# Patient Record
Sex: MALE | Race: WHITE | NOT HISPANIC OR LATINO | Employment: OTHER | URBAN - METROPOLITAN AREA
[De-identification: names, ages, dates, MRNs, and addresses within clinical notes are randomized per-mention and may not be internally consistent; named-entity substitution may affect disease eponyms.]

---

## 2023-07-05 LAB
ALANINE AMINOTRANSFERASE (SGPT) (U/L) IN SER/PLAS: 23 U/L (ref 10–52)
ALBUMIN (G/DL) IN SER/PLAS: 4.1 G/DL (ref 3.4–5)
ALBUMIN (MG/L) IN URINE: 28.9 MG/L
ALBUMIN/CREATININE (UG/MG) IN URINE: 43.9 UG/MG CRT (ref 0–30)
ALKALINE PHOSPHATASE (U/L) IN SER/PLAS: 70 U/L (ref 33–136)
ANION GAP IN SER/PLAS: 13 MMOL/L (ref 10–20)
ASPARTATE AMINOTRANSFERASE (SGOT) (U/L) IN SER/PLAS: 28 U/L (ref 9–39)
BASOPHILS (10*3/UL) IN BLOOD BY AUTOMATED COUNT: 0.07 X10E9/L (ref 0–0.1)
BASOPHILS/100 LEUKOCYTES IN BLOOD BY AUTOMATED COUNT: 1 % (ref 0–2)
BILIRUBIN TOTAL (MG/DL) IN SER/PLAS: 0.8 MG/DL (ref 0–1.2)
CALCIDIOL (25 OH VITAMIN D3) (NG/ML) IN SER/PLAS: 60 NG/ML
CALCIUM (MG/DL) IN SER/PLAS: 10 MG/DL (ref 8.6–10.3)
CARBON DIOXIDE, TOTAL (MMOL/L) IN SER/PLAS: 24 MMOL/L (ref 21–32)
CHLORIDE (MMOL/L) IN SER/PLAS: 105 MMOL/L (ref 98–107)
CHOLESTEROL (MG/DL) IN SER/PLAS: 121 MG/DL (ref 0–199)
CHOLESTEROL IN HDL (MG/DL) IN SER/PLAS: 61.1 MG/DL
CHOLESTEROL/HDL RATIO: 2
CREATININE (MG/DL) IN SER/PLAS: 0.93 MG/DL (ref 0.5–1.3)
CREATININE (MG/DL) IN URINE: 65.9 MG/DL (ref 20–370)
EOSINOPHILS (10*3/UL) IN BLOOD BY AUTOMATED COUNT: 0.11 X10E9/L (ref 0–0.4)
EOSINOPHILS/100 LEUKOCYTES IN BLOOD BY AUTOMATED COUNT: 1.6 % (ref 0–6)
ERYTHROCYTE DISTRIBUTION WIDTH (RATIO) BY AUTOMATED COUNT: 13.6 % (ref 11.5–14.5)
ERYTHROCYTE MEAN CORPUSCULAR HEMOGLOBIN CONCENTRATION (G/DL) BY AUTOMATED: 34.1 G/DL (ref 32–36)
ERYTHROCYTE MEAN CORPUSCULAR VOLUME (FL) BY AUTOMATED COUNT: 96 FL (ref 80–100)
ERYTHROCYTES (10*6/UL) IN BLOOD BY AUTOMATED COUNT: 4.14 X10E12/L (ref 4.5–5.9)
GFR MALE: 79 ML/MIN/1.73M2
GLUCOSE (MG/DL) IN SER/PLAS: 81 MG/DL (ref 74–99)
HEMATOCRIT (%) IN BLOOD BY AUTOMATED COUNT: 39.6 % (ref 41–52)
HEMOGLOBIN (G/DL) IN BLOOD: 13.5 G/DL (ref 13.5–17.5)
IMMATURE GRANULOCYTES/100 LEUKOCYTES IN BLOOD BY AUTOMATED COUNT: 0.1 % (ref 0–0.9)
LDL: 47 MG/DL (ref 0–99)
LEUKOCYTES (10*3/UL) IN BLOOD BY AUTOMATED COUNT: 6.7 X10E9/L (ref 4.4–11.3)
LYMPHOCYTES (10*3/UL) IN BLOOD BY AUTOMATED COUNT: 0.85 X10E9/L (ref 0.8–3)
LYMPHOCYTES/100 LEUKOCYTES IN BLOOD BY AUTOMATED COUNT: 12.6 % (ref 13–44)
MONOCYTES (10*3/UL) IN BLOOD BY AUTOMATED COUNT: 0.35 X10E9/L (ref 0.05–0.8)
MONOCYTES/100 LEUKOCYTES IN BLOOD BY AUTOMATED COUNT: 5.2 % (ref 2–10)
NEUTROPHILS (10*3/UL) IN BLOOD BY AUTOMATED COUNT: 5.34 X10E9/L (ref 1.6–5.5)
NEUTROPHILS/100 LEUKOCYTES IN BLOOD BY AUTOMATED COUNT: 79.5 % (ref 40–80)
PLATELETS (10*3/UL) IN BLOOD AUTOMATED COUNT: 181 X10E9/L (ref 150–450)
POTASSIUM (MMOL/L) IN SER/PLAS: 4.5 MMOL/L (ref 3.5–5.3)
PROTEIN TOTAL: 6.5 G/DL (ref 6.4–8.2)
SODIUM (MMOL/L) IN SER/PLAS: 137 MMOL/L (ref 136–145)
TRIGLYCERIDE (MG/DL) IN SER/PLAS: 64 MG/DL (ref 0–149)
UREA NITROGEN (MG/DL) IN SER/PLAS: 34 MG/DL (ref 6–23)
VLDL: 13 MG/DL (ref 0–40)

## 2023-07-12 PROBLEM — R01.1 SYSTOLIC MURMUR: Status: ACTIVE | Noted: 2023-07-12

## 2023-07-12 PROBLEM — H60.91 RIGHT OTITIS EXTERNA: Status: ACTIVE | Noted: 2023-07-12

## 2023-07-12 PROBLEM — K59.00 CONSTIPATION: Status: ACTIVE | Noted: 2023-07-12

## 2023-07-12 PROBLEM — M25.512 CHRONIC PAIN OF BOTH SHOULDERS: Status: ACTIVE | Noted: 2023-07-12

## 2023-07-12 PROBLEM — G89.29 CHRONIC PAIN OF BOTH SHOULDERS: Status: ACTIVE | Noted: 2023-07-12

## 2023-07-12 PROBLEM — H10.13 ALLERGIC CONJUNCTIVITIS OF BOTH EYES: Status: ACTIVE | Noted: 2023-07-12

## 2023-07-12 PROBLEM — M25.511 CHRONIC PAIN OF BOTH SHOULDERS: Status: ACTIVE | Noted: 2023-07-12

## 2023-07-12 PROBLEM — G47.9 SLEEP DISORDER: Status: ACTIVE | Noted: 2023-07-12

## 2023-07-12 PROBLEM — I10 BENIGN ESSENTIAL HYPERTENSION: Status: ACTIVE | Noted: 2023-07-12

## 2023-07-12 PROBLEM — I73.9 PVD (PERIPHERAL VASCULAR DISEASE) (CMS-HCC): Status: ACTIVE | Noted: 2023-07-12

## 2023-07-12 PROBLEM — M12.811 ROTATOR CUFF ARTHROPATHY OF RIGHT SHOULDER: Status: ACTIVE | Noted: 2023-07-12

## 2023-07-12 PROBLEM — R31.29 OTHER MICROSCOPIC HEMATURIA: Status: ACTIVE | Noted: 2023-07-12

## 2023-07-12 PROBLEM — I35.8 AORTIC VALVE SCLEROSIS: Status: ACTIVE | Noted: 2023-07-12

## 2023-07-12 PROBLEM — N40.0 BENIGN PROSTATIC HYPERPLASIA: Status: ACTIVE | Noted: 2023-07-12

## 2023-07-12 PROBLEM — G56.01 CARPAL TUNNEL SYNDROME OF RIGHT WRIST: Status: ACTIVE | Noted: 2023-07-12

## 2023-07-12 PROBLEM — R97.20 HIGH PROSTATE SPECIFIC ANTIGEN (PSA): Status: ACTIVE | Noted: 2023-07-12

## 2023-07-12 PROBLEM — E78.2 MIXED HYPERLIPIDEMIA: Status: ACTIVE | Noted: 2023-07-12

## 2023-07-12 PROBLEM — F32.A DEPRESSION: Status: ACTIVE | Noted: 2023-07-12

## 2023-07-12 PROBLEM — I65.21 ASYMPTOMATIC STENOSIS OF RIGHT CAROTID ARTERY: Status: ACTIVE | Noted: 2023-07-12

## 2023-07-12 PROBLEM — I44.1 MOBITZ (TYPE) I (WENCKEBACH'S) ATRIOVENTRICULAR BLOCK: Status: ACTIVE | Noted: 2023-07-12

## 2023-07-12 PROBLEM — L60.2 OVERGROWN TOENAILS: Status: ACTIVE | Noted: 2023-07-12

## 2023-07-12 PROBLEM — J30.2 SEASONAL ALLERGIC RHINITIS: Status: ACTIVE | Noted: 2023-07-12

## 2023-07-12 PROBLEM — E55.9 VITAMIN D DEFICIENCY: Status: ACTIVE | Noted: 2023-07-12

## 2023-07-12 PROBLEM — R14.3 FLATULENCE: Status: ACTIVE | Noted: 2023-07-12

## 2023-07-12 PROBLEM — M19.90 OSTEOARTHRITIS: Status: ACTIVE | Noted: 2023-07-12

## 2023-07-12 PROBLEM — J32.9 CHRONIC SINUSITIS: Status: ACTIVE | Noted: 2023-07-12

## 2023-07-12 PROBLEM — M19.012 ARTHRITIS OF LEFT GLENOHUMERAL JOINT: Status: ACTIVE | Noted: 2023-07-12

## 2023-07-12 RX ORDER — TAMSULOSIN HYDROCHLORIDE 0.4 MG/1
1 CAPSULE ORAL DAILY
COMMUNITY
Start: 2022-04-14

## 2023-07-12 RX ORDER — LISINOPRIL AND HYDROCHLOROTHIAZIDE 12.5; 2 MG/1; MG/1
1 TABLET ORAL DAILY
COMMUNITY
Start: 2019-06-17 | End: 2023-07-13 | Stop reason: SDUPTHER

## 2023-07-12 RX ORDER — ASPIRIN 81 MG/1
1 TABLET ORAL DAILY
COMMUNITY
Start: 2020-11-18

## 2023-07-12 RX ORDER — ATORVASTATIN CALCIUM 40 MG/1
1 TABLET, FILM COATED ORAL NIGHTLY
COMMUNITY
Start: 2019-06-17 | End: 2023-07-13 | Stop reason: SDUPTHER

## 2023-07-12 RX ORDER — SIMETHICONE 180 MG
180 CAPSULE ORAL
COMMUNITY
Start: 2023-02-06 | End: 2024-01-11 | Stop reason: WASHOUT

## 2023-07-12 RX ORDER — CLOBETASOL PROPIONATE 0.46 MG/ML
1 SOLUTION TOPICAL 2 TIMES DAILY
COMMUNITY
Start: 2021-10-22 | End: 2024-01-11 | Stop reason: WASHOUT

## 2023-07-12 RX ORDER — ASPIRIN 81 MG
1 TABLET, DELAYED RELEASE (ENTERIC COATED) ORAL DAILY
COMMUNITY
End: 2024-01-11 | Stop reason: WASHOUT

## 2023-07-12 RX ORDER — MELATONIN 3 MG
1 CAPSULE ORAL NIGHTLY PRN
COMMUNITY
Start: 2022-08-04 | End: 2024-01-11 | Stop reason: WASHOUT

## 2023-07-12 RX ORDER — CHOLECALCIFEROL (VITAMIN D3) 25 MCG
1 TABLET ORAL DAILY
COMMUNITY
Start: 2019-06-25

## 2023-07-12 RX ORDER — ASCORBIC ACID 500 MG
1 TABLET ORAL DAILY
COMMUNITY
Start: 2019-06-25

## 2023-07-13 ENCOUNTER — OFFICE VISIT (OUTPATIENT)
Dept: PRIMARY CARE | Facility: CLINIC | Age: 88
End: 2023-07-13
Payer: COMMERCIAL

## 2023-07-13 VITALS
HEIGHT: 65 IN | TEMPERATURE: 96.4 F | OXYGEN SATURATION: 97 % | HEART RATE: 74 BPM | RESPIRATION RATE: 16 BRPM | WEIGHT: 142 LBS | DIASTOLIC BLOOD PRESSURE: 83 MMHG | SYSTOLIC BLOOD PRESSURE: 133 MMHG | BODY MASS INDEX: 23.66 KG/M2

## 2023-07-13 DIAGNOSIS — K59.00 CONSTIPATION, UNSPECIFIED CONSTIPATION TYPE: ICD-10-CM

## 2023-07-13 DIAGNOSIS — I65.21 CAROTID STENOSIS, RIGHT: ICD-10-CM

## 2023-07-13 DIAGNOSIS — E78.2 MIXED HYPERLIPIDEMIA: Primary | ICD-10-CM

## 2023-07-13 DIAGNOSIS — I10 BENIGN ESSENTIAL HYPERTENSION: ICD-10-CM

## 2023-07-13 DIAGNOSIS — R26.81 UNSTEADY GAIT WHEN WALKING: ICD-10-CM

## 2023-07-13 DIAGNOSIS — R63.4 WEIGHT LOSS: ICD-10-CM

## 2023-07-13 DIAGNOSIS — Z00.00 ROUTINE GENERAL MEDICAL EXAMINATION AT HEALTH CARE FACILITY: ICD-10-CM

## 2023-07-13 DIAGNOSIS — I44.1 MOBITZ (TYPE) I (WENCKEBACH'S) ATRIOVENTRICULAR BLOCK: ICD-10-CM

## 2023-07-13 DIAGNOSIS — R97.20 HIGH PROSTATE SPECIFIC ANTIGEN (PSA): ICD-10-CM

## 2023-07-13 DIAGNOSIS — M51.06 LUMBAR DISC DISORDER WITH MYELOPATHY: ICD-10-CM

## 2023-07-13 DIAGNOSIS — R52 PAIN: ICD-10-CM

## 2023-07-13 PROBLEM — M15.0 PRIMARY GENERALIZED (OSTEO)ARTHRITIS: Status: ACTIVE | Noted: 2023-07-13

## 2023-07-13 PROBLEM — Z86.79 H/O: HYPERTENSION: Status: ACTIVE | Noted: 2020-12-04

## 2023-07-13 PROBLEM — I65.22 LEFT CAROTID STENOSIS: Status: ACTIVE | Noted: 2023-07-13

## 2023-07-13 PROBLEM — K40.30 INCARCERATED INGUINAL HERNIA, UNILATERAL: Status: ACTIVE | Noted: 2023-07-13

## 2023-07-13 PROBLEM — G56.00 CARPAL TUNNEL SYNDROME: Status: ACTIVE | Noted: 2019-06-07

## 2023-07-13 PROBLEM — Z86.59 H/O: DEPRESSION: Status: ACTIVE | Noted: 2020-12-04

## 2023-07-13 PROBLEM — E66.3 OVERWEIGHT WITH BODY MASS INDEX (BMI) 25.0-29.9: Status: ACTIVE | Noted: 2020-12-04

## 2023-07-13 PROBLEM — I49.9 IRREGULAR HEARTBEAT: Status: ACTIVE | Noted: 2023-07-13

## 2023-07-13 PROBLEM — Z86.39 HISTORY OF ELEVATED LIPIDS: Status: ACTIVE | Noted: 2020-12-04

## 2023-07-13 PROBLEM — M17.11 PRIMARY OSTEOARTHRITIS OF RIGHT KNEE: Status: ACTIVE | Noted: 2023-07-13

## 2023-07-13 PROCEDURE — 1036F TOBACCO NON-USER: CPT | Performed by: INTERNAL MEDICINE

## 2023-07-13 PROCEDURE — 1159F MED LIST DOCD IN RCRD: CPT | Performed by: INTERNAL MEDICINE

## 2023-07-13 PROCEDURE — 99214 OFFICE O/P EST MOD 30 MIN: CPT | Performed by: INTERNAL MEDICINE

## 2023-07-13 PROCEDURE — 1170F FXNL STATUS ASSESSED: CPT | Performed by: INTERNAL MEDICINE

## 2023-07-13 PROCEDURE — 1125F AMNT PAIN NOTED PAIN PRSNT: CPT | Performed by: INTERNAL MEDICINE

## 2023-07-13 PROCEDURE — 1160F RVW MEDS BY RX/DR IN RCRD: CPT | Performed by: INTERNAL MEDICINE

## 2023-07-13 PROCEDURE — G0439 PPPS, SUBSEQ VISIT: HCPCS | Performed by: INTERNAL MEDICINE

## 2023-07-13 PROCEDURE — 3075F SYST BP GE 130 - 139MM HG: CPT | Performed by: INTERNAL MEDICINE

## 2023-07-13 PROCEDURE — 3079F DIAST BP 80-89 MM HG: CPT | Performed by: INTERNAL MEDICINE

## 2023-07-13 RX ORDER — ATORVASTATIN CALCIUM 40 MG/1
40 TABLET, FILM COATED ORAL NIGHTLY
Qty: 90 TABLET | Refills: 1 | Status: SHIPPED | OUTPATIENT
Start: 2023-07-13

## 2023-07-13 RX ORDER — LISINOPRIL AND HYDROCHLOROTHIAZIDE 12.5; 2 MG/1; MG/1
1 TABLET ORAL DAILY
Qty: 90 TABLET | Refills: 1 | Status: SHIPPED | OUTPATIENT
Start: 2023-07-13 | End: 2024-05-03

## 2023-07-13 RX ORDER — GABAPENTIN 100 MG/1
200 CAPSULE ORAL NIGHTLY
Qty: 60 CAPSULE | Refills: 3 | Status: SHIPPED | OUTPATIENT
Start: 2023-07-13 | End: 2023-11-13

## 2023-07-13 SDOH — ECONOMIC STABILITY: FOOD INSECURITY: WITHIN THE PAST 12 MONTHS, YOU WORRIED THAT YOUR FOOD WOULD RUN OUT BEFORE YOU GOT MONEY TO BUY MORE.: NEVER TRUE

## 2023-07-13 SDOH — ECONOMIC STABILITY: FOOD INSECURITY: WITHIN THE PAST 12 MONTHS, THE FOOD YOU BOUGHT JUST DIDN'T LAST AND YOU DIDN'T HAVE MONEY TO GET MORE.: NEVER TRUE

## 2023-07-13 ASSESSMENT — PATIENT HEALTH QUESTIONNAIRE - PHQ9
1. LITTLE INTEREST OR PLEASURE IN DOING THINGS: NOT AT ALL
2. FEELING DOWN, DEPRESSED OR HOPELESS: NOT AT ALL
SUM OF ALL RESPONSES TO PHQ9 QUESTIONS 1 & 2: 0

## 2023-07-13 ASSESSMENT — ACTIVITIES OF DAILY LIVING (ADL)
GROCERY_SHOPPING: INDEPENDENT
TAKING_MEDICATION: INDEPENDENT
DOING_HOUSEWORK: INDEPENDENT
DRESSING: INDEPENDENT
MANAGING_FINANCES: INDEPENDENT
BATHING: INDEPENDENT

## 2023-07-13 ASSESSMENT — LIFESTYLE VARIABLES
SKIP TO QUESTIONS 9-10: 1
HOW OFTEN DO YOU HAVE A DRINK CONTAINING ALCOHOL: NEVER
AUDIT-C TOTAL SCORE: 0
HOW MANY STANDARD DRINKS CONTAINING ALCOHOL DO YOU HAVE ON A TYPICAL DAY: PATIENT DOES NOT DRINK
HOW OFTEN DO YOU HAVE SIX OR MORE DRINKS ON ONE OCCASION: NEVER

## 2023-07-13 ASSESSMENT — ENCOUNTER SYMPTOMS
DEPRESSION: 0
OCCASIONAL FEELINGS OF UNSTEADINESS: 1
LOSS OF SENSATION IN FEET: 1

## 2023-07-13 ASSESSMENT — PAIN SCALES - GENERAL: PAINLEVEL: 8

## 2023-07-13 NOTE — PROGRESS NOTES
"Subjective   Patient ID: Hubert Emmanuel is a 87 y.o. male who presents for Medicare Annual Wellness Visit Subsequent.  HPI    Health maintenance: Patient is up to date with vaccinations. He has COVID vaccine x 4, Shingrix. Believes he is up to date on tetanus.     Balanced issues: He is scheduled to go get his hearing checked next week, as he has been having equilibrium issues recently. This does make him fall, and he struggles to get back up afterwards. He reports that if he gets a bit off balance, he has a hard time correcting himself quickly. He does not describe this as \"unsteadiness\".     Right buttock pain: Patient complains of ongoing right buttock pain, acting up more in the past couple of weeks. He does have a history of disc degeneration in his lumbar spine. He denies any pain radiating down his legs.      Bloating, increased gas: Last visit complained of increased flatulence. BMs are once a day and soft. His constipation has been well controlled with Senna for years. He does eat a varied diet. I'm walking around and I start tootin\" patient states. Trial of simethicone last visit but this did not make much difference.      S/P Carotid endarterectomy: Completed in [place]. Patient has seen Dr Garnica for follow up.     Mobitz type 1 2nd degree heart block: patient sees Dr Garnica who noted in her last note that patient will likely require pace maker in the future. Scheduled for follow up in January.      OA of the knees/ shoulder: shoulders and knees, \"are the same\". He has had one shoulder injection in the past that helped somewhat. He is still taking Aleve, but he does not feel that it is strong enough.      HTN: patient takes lisinopril/ HCTZ. BP today 133/84. Denies lightheadedness and dizziness.      Hyperlipidemia: Patient takes atorvastatin. Last lipid panel 7/23 was within normal limits. Denies side effects.      Elevated PSA: Patient is following with Dr Greenfield. No change in tamsulosin, working " "well.     In addition to that documented in the HPI above, the additional ROS was obtained:  Constitutional: Denies fevers or chills  Eyes: Denies vision changes  ENMT: Denies trouble swallowing  Cardiovascular: Denies chest pain or heart racing  Respiratory: Denies SOB or cough  Gastrointestinal: Denies nausea, vomiting, diarrhea or constipation  Musculoskeletal: Denies joint pain or joint swelling  Neuro: denies frequent headaches or dizziness  Psych: denies depression or anxiety      Objective     Visit Vitals  /83 (BP Location: Left arm, Patient Position: Sitting, BP Cuff Size: Adult)   Pulse 74   Temp 35.8 °C (96.4 °F) (Temporal)   Resp 16   Ht 1.651 m (5' 5\")   Wt 64.4 kg (142 lb)   SpO2 97%   BMI 23.63 kg/m²   Smoking Status Former   BSA 1.72 m²        CONSTITUTIONAL - well nourished, well developed, looks like stated age, in no acute distress, not ill-appearing, and not tired appearing, wearing back brace  SKIN - normal skin color and pigmentation, normal skin turgor without rash, lesions, or nodules visualized  HEAD - no trauma, normocephalic  EYES - extraocular muscles are intact, minimal tearing R>L eye  NECK - supple without rigidity, no neck mass was observed  CHEST - clear to auscultation, no wheezing, no crackles and no rales, good effort  CARDIAC - regular rate and regular rhythm, no skipped beats, 1/6 systolic murmur, heart tones are distant  EXTREMITIES - trace edema right ankle, no deformities  NEUROLOGICAL - alert, oriented and no focal signs  PSYCHIATRIC - alert, pleasant and cordial, age-appropriate    Health Maintenance Due   Topic Date Due   • Medicare Annual Wellness Visit (AWV)  Never done   • DTaP/Tdap/Td Vaccines (1 - Tdap) Never done   • Diabetes Screening  06/02/2021   • COVID-19 Vaccine (5 - Booster for Moderna series) 12/30/2022        Assessment/Plan   Problem List Items Addressed This Visit          Cardiac and Vasculature    Benign essential hypertension     Stable, continue " lisinopril/hydrochlorothiazide.          Mixed hyperlipidemia - Primary     Last lipid panel 7/2023 was within normal limits. Continue atorvastatin.          Mobitz (type) I (Wenckebach's) atrioventricular block     Continue to follow up with Dr. Garnica as scheduled - due to follow up in January.             Gastrointestinal and Abdominal    Constipation     Stable, continue with Senna daily.             Genitourinary and Reproductive    High prostate specific antigen (PSA)     Continue to follow up with urology. Stable. Continue tamsulosin.

## 2023-07-13 NOTE — PROGRESS NOTES
"Subjective   Reason for Visit: Hubert Emmanuel is an 87 y.o. male here for a Medicare Wellness visit.     Past Medical, Surgical, and Family History reviewed and updated in chart.    Reviewed all medications by prescribing practitioner or clinical pharmacist (such as prescriptions, OTCs, herbal therapies and supplements) and documented in the medical record.    HPI  Medicare wellness exam today    Health maintenance: Patient is up to date with vaccinations. He has COVID vaccine x 4, Shingrix. Believes he is up to date on tetanus.      Balanced issues: He is scheduled to go get his hearing checked next week, as he has been having equilibrium issues recently. This does make him fall, and he struggles to get back up afterwards. He reports that if he gets a bit off balance, he has a hard time correcting himself quickly. He does not describe this as \"unsteadiness\". Patient developed issues after developing an ear infection after swimming on college. He has had some issues with his ears since that time. He denies any nausea or vomiting, He denies any vertigo symptoms.      Right buttock pain: Patient complains of ongoing right buttock pain, acting up more in the past couple of weeks. He does have a history of disc degeneration in his lumbar spine. He denies any pain radiating down his legs. Patient that the buttocks gets sore at times      Bloating, increased gas: Last visit complained of increased flatulence. BMs are once a day and soft. His constipation has been well controlled with Senna for years. He does eat a varied diet. \"I'm walking around and I start tootin\" patient states. Trial of simethicone last visit but this did not make much difference.      S/P Carotid endarterectomy: Completed in [place]. Patient has seen Dr Garnica for follow up.     Eliazar type 1 2nd degree heart block: patient sees Dr Garnica, patient apparently does not require a pacemaker now, he follow up with her again  in January.      OA of " "the knees/ shoulder: shoulders and knees, \"are the same\". He has had one shoulder injection in the past that helped somewhat. He is still taking Aleve, but he does not feel that it is strong enough.      HTN: patient takes lisinopril/ HCTZ. BP today 133/84. Denies lightheadedness and dizziness.      Hyperlipidemia: Patient takes atorvastatin.      Elevated PSA: Patient is following with Dr Sprague. No change in tamsulosin, working well.     Patient Care Team:  Acosta Jovel MD as PCP - General  Acosta Jovel MD as PCP - [employer] PCP     Review of Systems      In addition to that documented in the HPI above, the additional ROS was obtained:  Constitutional: Denies fevers or chills  Eyes: Denies vision changes  ENMT: Denies trouble swallowing, he is concerned about hearing issues  Cardiovascular: Denies chest pain or heart racing  Respiratory: Denies SOB or cough  Gastrointestinal: Denies nausea, vomiting, diarrhea or constipation  Musculoskeletal: Denies joint pain or joint swelling  Neuro: denies frequent headaches or dizziness, he does have an unsteady gait noted  Psych: denies depression or anxiety       Objective   Vitals:  /83 (BP Location: Left arm, Patient Position: Sitting, BP Cuff Size: Adult)   Pulse 74   Temp 35.8 °C (96.4 °F) (Temporal)   Resp 16   Ht 1.651 m (5' 5\")   Wt 64.4 kg (142 lb)   SpO2 97%   BMI 23.63 kg/m²       Physical Exam    CONSTITUTIONAL - well nourished, well developed, looks like stated age, in no acute distress, not ill-appearing, and not tired appearing, wearing back brace  SKIN - normal skin color and pigmentation, normal skin turgor without rash, lesions, or nodules visualized  HEAD - no trauma, normocephalic  EYES - extraocular muscles are intact, minimal tearing R>L eye  NECK - supple without rigidity, no neck mass was observed  CHEST - clear to auscultation, no wheezing, no crackles and no rales, good effort  CARDIAC - regular rate and " regular rhythm, no skipped beats, 2/6 systolic murmur noted primarily over the RUSB , heart tones are distant  EXTREMITIES - trace edema right ankle, no deformities  NEUROLOGICAL - alert, oriented and no focal signs  PSYCHIATRIC - alert, pleasant and cordial, age-appropriate  Assessment/Plan   Problem List Items Addressed This Visit       Benign essential hypertension    Current Assessment & Plan     Stable, continue lisinopril/hydrochlorothiazide. Follow up in 6 months         Relevant Medications    lisinopriL-hydrochlorothiazide 20-12.5 mg tablet    Other Relevant Orders    Albumin , Urine Random    Comprehensive Metabolic Panel    CBC and Auto Differential    Constipation    Current Assessment & Plan     Stable, continue with Senna daily.          Relevant Orders    Comprehensive Metabolic Panel    CBC and Auto Differential    High prostate specific antigen (PSA)    Current Assessment & Plan     Continue to follow up with urology. Stable. Continue tamsulosin.          Relevant Orders    Comprehensive Metabolic Panel    CBC and Auto Differential    Mixed hyperlipidemia - Primary    Current Assessment & Plan     Last lipid panel 7/2023 was within normal limits. Continue atorvastatin. Recheck labs in 6 months         Relevant Medications    atorvastatin (Lipitor) 40 mg tablet    Other Relevant Orders    Albumin , Urine Random    Comprehensive Metabolic Panel    CBC and Auto Differential    Lipid panel    Mobitz (type) I (Wenckebach's) atrioventricular block    Current Assessment & Plan     Continue to follow up with Dr. Garnica as scheduled - due to follow up in January.          Relevant Orders    Comprehensive Metabolic Panel    CBC and Auto Differential    Lumbar disc disorder with myelopathy    Current Assessment & Plan     Patient has pain in the right lower back, buttocks region. Trial of narcotic pain med would not be recommended for a patient at this age. Trial of gabapentin prior to bed, see if this is  helps, will order PT to try to alleviate the symptoms, this may help with balance issues also         Relevant Medications    gabapentin (Neurontin) 100 mg capsule    Other Relevant Orders    Vitamin D 1,25 Dihydroxy    Comprehensive Metabolic Panel    CBC and Auto Differential    Carotid stenosis, right    Current Assessment & Plan     Patient has a history of carotid stenosis , post endarterectomy, concerned that unsteady gait may be due to a stroke, check a CT of the head to see if the patient has any issue, follow up for hearing evaluation         Relevant Orders    CT head wo IV contrast    Comprehensive Metabolic Panel    CBC and Auto Differential    Routine general medical examination at health care facility    Current Assessment & Plan     Patient thinks that he has had tetanus booster within the last 10 years, will defer TDAP for now          Relevant Orders    Comprehensive Metabolic Panel    CBC and Auto Differential     Other Visit Diagnoses       Pain        Relevant Orders    PT eval and treat    Comprehensive Metabolic Panel    CBC and Auto Differential    Unsteady gait when walking        Relevant Orders    CT head wo IV contrast    Comprehensive Metabolic Panel    CBC and Auto Differential    Weight loss        Relevant Orders    TSH with reflex to Free T4 if abnormal          Check labs in 6 months, PT evaluation, trial of gabapentin for pain issues, check a CT of the head for issues of unsteadiness.

## 2023-07-13 NOTE — ASSESSMENT & PLAN NOTE
Patient has a history of carotid stenosis , post endarterectomy, concerned that unsteady gait may be due to a stroke, check a CT of the head to see if the patient has any issue, follow up for hearing evaluation

## 2023-07-13 NOTE — ASSESSMENT & PLAN NOTE
Patient has pain in the right lower back, buttocks region. Trial of narcotic pain med would not be recommended for a patient at this age. Trial of gabapentin prior to bed, see if this is helps, will order PT to try to alleviate the symptoms, this may help with balance issues also

## 2023-08-22 ENCOUNTER — TELEPHONE (OUTPATIENT)
Dept: PRIMARY CARE | Facility: CLINIC | Age: 88
End: 2023-08-22
Payer: COMMERCIAL

## 2023-08-22 DIAGNOSIS — M19.041 OSTEOARTHRITIS OF BOTH HANDS, UNSPECIFIED OSTEOARTHRITIS TYPE: Primary | ICD-10-CM

## 2023-08-22 DIAGNOSIS — M19.042 OSTEOARTHRITIS OF BOTH HANDS, UNSPECIFIED OSTEOARTHRITIS TYPE: Primary | ICD-10-CM

## 2023-08-22 NOTE — TELEPHONE ENCOUNTER
Rehab is requesting a referral for OT for his hands with arthritis. Rehab is requesting this for OT not PT.

## 2023-11-13 DIAGNOSIS — M51.06 LUMBAR DISC DISORDER WITH MYELOPATHY: ICD-10-CM

## 2023-11-13 PROBLEM — R26.89 IMPAIRED GAIT AND MOBILITY: Status: ACTIVE | Noted: 2023-11-13

## 2023-11-13 PROBLEM — R52 CHRONIC GENERALIZED PAIN: Status: ACTIVE | Noted: 2023-11-13

## 2023-11-13 PROBLEM — R29.6 MULTIPLE FALLS: Status: ACTIVE | Noted: 2023-11-13

## 2023-11-13 PROBLEM — R29.898 HAND WEAKNESS: Status: ACTIVE | Noted: 2023-11-13

## 2023-11-13 PROBLEM — G89.29 CHRONIC GENERALIZED PAIN: Status: ACTIVE | Noted: 2023-11-13

## 2023-11-13 PROBLEM — M25.649 FINGER STIFFNESS: Status: ACTIVE | Noted: 2023-11-13

## 2023-11-13 RX ORDER — CICLOPIROX 1 G/100ML
1 SHAMPOO TOPICAL 3 TIMES WEEKLY
COMMUNITY
Start: 2023-10-03 | End: 2024-01-11 | Stop reason: WASHOUT

## 2023-11-13 RX ORDER — GABAPENTIN 100 MG/1
200 CAPSULE ORAL NIGHTLY
Qty: 60 CAPSULE | Refills: 0 | Status: SHIPPED | OUTPATIENT
Start: 2023-11-13 | End: 2023-12-11

## 2023-12-09 DIAGNOSIS — M51.06 LUMBAR DISC DISORDER WITH MYELOPATHY: ICD-10-CM

## 2023-12-11 RX ORDER — GABAPENTIN 100 MG/1
200 CAPSULE ORAL NIGHTLY
Qty: 60 CAPSULE | Refills: 0 | Status: SHIPPED | OUTPATIENT
Start: 2023-12-11 | End: 2024-01-08

## 2023-12-22 ENCOUNTER — LAB (OUTPATIENT)
Dept: LAB | Facility: LAB | Age: 88
End: 2023-12-22
Payer: COMMERCIAL

## 2023-12-22 DIAGNOSIS — M51.06 LUMBAR DISC DISORDER WITH MYELOPATHY: ICD-10-CM

## 2023-12-22 DIAGNOSIS — I44.1 MOBITZ (TYPE) I (WENCKEBACH'S) ATRIOVENTRICULAR BLOCK: ICD-10-CM

## 2023-12-22 DIAGNOSIS — Z00.00 ROUTINE GENERAL MEDICAL EXAMINATION AT HEALTH CARE FACILITY: ICD-10-CM

## 2023-12-22 DIAGNOSIS — R52 PAIN: ICD-10-CM

## 2023-12-22 DIAGNOSIS — R26.81 UNSTEADY GAIT WHEN WALKING: ICD-10-CM

## 2023-12-22 DIAGNOSIS — R63.4 WEIGHT LOSS: ICD-10-CM

## 2023-12-22 DIAGNOSIS — E78.2 MIXED HYPERLIPIDEMIA: ICD-10-CM

## 2023-12-22 DIAGNOSIS — I10 BENIGN ESSENTIAL HYPERTENSION: ICD-10-CM

## 2023-12-22 DIAGNOSIS — R97.20 HIGH PROSTATE SPECIFIC ANTIGEN (PSA): ICD-10-CM

## 2023-12-22 DIAGNOSIS — K59.00 CONSTIPATION, UNSPECIFIED CONSTIPATION TYPE: ICD-10-CM

## 2023-12-22 DIAGNOSIS — I65.21 CAROTID STENOSIS, RIGHT: ICD-10-CM

## 2023-12-22 LAB
ALBUMIN SERPL BCP-MCNC: 4.1 G/DL (ref 3.4–5)
ALP SERPL-CCNC: 74 U/L (ref 33–136)
ALT SERPL W P-5'-P-CCNC: 23 U/L (ref 10–52)
ANION GAP SERPL CALC-SCNC: 14 MMOL/L (ref 10–20)
AST SERPL W P-5'-P-CCNC: 30 U/L (ref 9–39)
BASOPHILS # BLD AUTO: 0.05 X10*3/UL (ref 0–0.1)
BASOPHILS NFR BLD AUTO: 1.1 %
BILIRUB SERPL-MCNC: 0.8 MG/DL (ref 0–1.2)
BUN SERPL-MCNC: 31 MG/DL (ref 6–23)
CALCIUM SERPL-MCNC: 9.6 MG/DL (ref 8.6–10.3)
CHLORIDE SERPL-SCNC: 105 MMOL/L (ref 98–107)
CHOLEST SERPL-MCNC: 125 MG/DL (ref 0–199)
CHOLESTEROL/HDL RATIO: 1.7
CO2 SERPL-SCNC: 25 MMOL/L (ref 21–32)
CREAT SERPL-MCNC: 1.15 MG/DL (ref 0.5–1.3)
CREAT UR-MCNC: 109 MG/DL (ref 20–370)
EOSINOPHIL # BLD AUTO: 0.19 X10*3/UL (ref 0–0.4)
EOSINOPHIL NFR BLD AUTO: 4.2 %
ERYTHROCYTE [DISTWIDTH] IN BLOOD BY AUTOMATED COUNT: 14 % (ref 11.5–14.5)
GFR SERPL CREATININE-BSD FRML MDRD: 61 ML/MIN/1.73M*2
GLUCOSE SERPL-MCNC: 90 MG/DL (ref 74–99)
HCT VFR BLD AUTO: 41.4 % (ref 41–52)
HDLC SERPL-MCNC: 72 MG/DL
HGB BLD-MCNC: 13.7 G/DL (ref 13.5–17.5)
IMM GRANULOCYTES # BLD AUTO: 0.01 X10*3/UL (ref 0–0.5)
IMM GRANULOCYTES NFR BLD AUTO: 0.2 % (ref 0–0.9)
LDLC SERPL CALC-MCNC: 43 MG/DL
LYMPHOCYTES # BLD AUTO: 0.81 X10*3/UL (ref 0.8–3)
LYMPHOCYTES NFR BLD AUTO: 18 %
MCH RBC QN AUTO: 32.8 PG (ref 26–34)
MCHC RBC AUTO-ENTMCNC: 33.1 G/DL (ref 32–36)
MCV RBC AUTO: 99 FL (ref 80–100)
MICROALBUMIN UR-MCNC: 78.7 MG/L
MICROALBUMIN/CREAT UR: 72.2 UG/MG CREAT
MONOCYTES # BLD AUTO: 0.32 X10*3/UL (ref 0.05–0.8)
MONOCYTES NFR BLD AUTO: 7.1 %
NEUTROPHILS # BLD AUTO: 3.12 X10*3/UL (ref 1.6–5.5)
NEUTROPHILS NFR BLD AUTO: 69.4 %
NON HDL CHOLESTEROL: 53 MG/DL (ref 0–149)
NRBC BLD-RTO: 0 /100 WBCS (ref 0–0)
PLATELET # BLD AUTO: 153 X10*3/UL (ref 150–450)
POTASSIUM SERPL-SCNC: 4.3 MMOL/L (ref 3.5–5.3)
PROT SERPL-MCNC: 6.1 G/DL (ref 6.4–8.2)
RBC # BLD AUTO: 4.18 X10*6/UL (ref 4.5–5.9)
SODIUM SERPL-SCNC: 140 MMOL/L (ref 136–145)
TRIGL SERPL-MCNC: 52 MG/DL (ref 0–149)
TSH SERPL-ACNC: 1.92 MIU/L (ref 0.44–3.98)
VLDL: 10 MG/DL (ref 0–40)
WBC # BLD AUTO: 4.5 X10*3/UL (ref 4.4–11.3)

## 2023-12-22 PROCEDURE — 85025 COMPLETE CBC W/AUTO DIFF WBC: CPT

## 2023-12-22 PROCEDURE — 36415 COLL VENOUS BLD VENIPUNCTURE: CPT

## 2023-12-22 PROCEDURE — 82570 ASSAY OF URINE CREATININE: CPT

## 2023-12-22 PROCEDURE — 80061 LIPID PANEL: CPT

## 2023-12-22 PROCEDURE — 82043 UR ALBUMIN QUANTITATIVE: CPT

## 2023-12-22 PROCEDURE — 82652 VIT D 1 25-DIHYDROXY: CPT

## 2023-12-22 PROCEDURE — 80053 COMPREHEN METABOLIC PANEL: CPT

## 2023-12-22 PROCEDURE — 84443 ASSAY THYROID STIM HORMONE: CPT

## 2023-12-24 LAB — 1,25(OH)2D3 SERPL-MCNC: 56 PG/ML (ref 19.9–79.3)

## 2024-01-07 DIAGNOSIS — M51.06 LUMBAR DISC DISORDER WITH MYELOPATHY: ICD-10-CM

## 2024-01-08 PROBLEM — G56.01 CARPAL TUNNEL SYNDROME OF RIGHT WRIST: Status: RESOLVED | Noted: 2023-07-12 | Resolved: 2024-01-08

## 2024-01-08 PROBLEM — F32.A DEPRESSION: Status: RESOLVED | Noted: 2023-07-12 | Resolved: 2024-01-08

## 2024-01-08 PROBLEM — Z86.79 H/O: HYPERTENSION: Status: RESOLVED | Noted: 2020-12-04 | Resolved: 2024-01-08

## 2024-01-08 PROBLEM — K59.00 CONSTIPATION: Status: RESOLVED | Noted: 2023-07-12 | Resolved: 2024-01-08

## 2024-01-08 PROBLEM — Z86.59 H/O: DEPRESSION: Status: RESOLVED | Noted: 2020-12-04 | Resolved: 2024-01-08

## 2024-01-08 RX ORDER — FLUOROURACIL 50 MG/G
1 CREAM TOPICAL 2 TIMES DAILY
COMMUNITY
Start: 2019-03-18 | End: 2024-01-11 | Stop reason: WASHOUT

## 2024-01-08 RX ORDER — LORATADINE 10 MG/1
10 TABLET ORAL EVERY 24 HOURS
COMMUNITY
End: 2024-01-11 | Stop reason: WASHOUT

## 2024-01-08 RX ORDER — AMMONIUM LACTATE 12 G/100G
1 LOTION TOPICAL AS NEEDED
COMMUNITY
Start: 2018-05-23 | End: 2024-01-11 | Stop reason: WASHOUT

## 2024-01-08 RX ORDER — FLUOCINOLONE ACETONIDE 0.01 %
1 KIT TOPICAL
COMMUNITY
Start: 2023-05-11 | End: 2024-01-11 | Stop reason: WASHOUT

## 2024-01-08 RX ORDER — IMIQUIMOD 12.5 MG/.25G
1 CREAM TOPICAL NIGHTLY
COMMUNITY
End: 2024-01-11 | Stop reason: WASHOUT

## 2024-01-08 RX ORDER — KETOCONAZOLE 20 MG/ML
1 SHAMPOO, SUSPENSION TOPICAL 2 TIMES WEEKLY
COMMUNITY
End: 2024-01-11 | Stop reason: WASHOUT

## 2024-01-08 RX ORDER — GABAPENTIN 100 MG/1
200 CAPSULE ORAL NIGHTLY
Qty: 60 CAPSULE | Refills: 0 | Status: SHIPPED | OUTPATIENT
Start: 2024-01-08 | End: 2024-01-18 | Stop reason: SDUPTHER

## 2024-01-11 ENCOUNTER — OFFICE VISIT (OUTPATIENT)
Dept: CARDIOLOGY | Facility: CLINIC | Age: 89
End: 2024-01-11
Payer: COMMERCIAL

## 2024-01-11 VITALS
HEIGHT: 65 IN | WEIGHT: 146 LBS | DIASTOLIC BLOOD PRESSURE: 68 MMHG | HEART RATE: 87 BPM | SYSTOLIC BLOOD PRESSURE: 118 MMHG | BODY MASS INDEX: 24.32 KG/M2

## 2024-01-11 DIAGNOSIS — I44.1 MOBITZ (TYPE) I (WENCKEBACH'S) ATRIOVENTRICULAR BLOCK: ICD-10-CM

## 2024-01-11 DIAGNOSIS — I35.8 AORTIC VALVE SCLEROSIS: ICD-10-CM

## 2024-01-11 DIAGNOSIS — I10 BENIGN ESSENTIAL HYPERTENSION: Primary | ICD-10-CM

## 2024-01-11 PROCEDURE — 1160F RVW MEDS BY RX/DR IN RCRD: CPT | Performed by: INTERNAL MEDICINE

## 2024-01-11 PROCEDURE — 3078F DIAST BP <80 MM HG: CPT | Performed by: INTERNAL MEDICINE

## 2024-01-11 PROCEDURE — 1159F MED LIST DOCD IN RCRD: CPT | Performed by: INTERNAL MEDICINE

## 2024-01-11 PROCEDURE — 99213 OFFICE O/P EST LOW 20 MIN: CPT | Performed by: INTERNAL MEDICINE

## 2024-01-11 PROCEDURE — 93000 ELECTROCARDIOGRAM COMPLETE: CPT | Performed by: INTERNAL MEDICINE

## 2024-01-11 PROCEDURE — 1036F TOBACCO NON-USER: CPT | Performed by: INTERNAL MEDICINE

## 2024-01-11 PROCEDURE — 1125F AMNT PAIN NOTED PAIN PRSNT: CPT | Performed by: INTERNAL MEDICINE

## 2024-01-11 PROCEDURE — 3074F SYST BP LT 130 MM HG: CPT | Performed by: INTERNAL MEDICINE

## 2024-01-11 RX ORDER — AMOXICILLIN 250 MG
1 CAPSULE ORAL DAILY
COMMUNITY
End: 2024-01-18 | Stop reason: WASHOUT

## 2024-01-11 ASSESSMENT — ENCOUNTER SYMPTOMS
OCCASIONAL FEELINGS OF UNSTEADINESS: 0
DEPRESSION: 0
LOSS OF SENSATION IN FEET: 0

## 2024-01-11 NOTE — PROGRESS NOTES
"Chief Complaint:   Follow-up (annual)     History Of Present Illness:    Hubert Emmanuel is a 88 y.o. male with hypertension, hyperlipidemia and b/l carotid endarterectomy who was seen for Mobitz type 1 block on ECG . No history of TIA or CVA. Does not have chest pain or shortness of breath. Remains active and independent. No orthopnea, PND, palpitations, lightheadedness or loss of consciousness. He has balance problems and falls but denies losing consciousness or being dizzy. Has chronic ankle swelling.      EKG shows sinus rhythm with First-degree AV block. PVCs. Echo from 2020 showing aortic valve sclerosis normal LV function.    .     Last Recorded Vitals:  Vitals:    01/11/24 1046   BP: 118/68   BP Location: Left arm   Pulse: 87   Weight: 66.2 kg (146 lb)   Height: 1.651 m (5' 5\")       Past Medical History:  He has a past medical history of Occlusion and stenosis of bilateral carotid arteries, Personal history of diseases of the skin and subcutaneous tissue, Personal history of other drug therapy (11/11/2019), and Personal history of other infectious and parasitic diseases.    Past Surgical History:  He has a past surgical history that includes Other surgical history (06/12/2019); Other surgical history (06/12/2019); Other surgical history (06/12/2019); and Other surgical history (06/12/2019).      Social History:  He reports that he quit smoking about 34 years ago. His smoking use included cigarettes. He has never used smokeless tobacco. He reports that he does not currently use alcohol. He reports that he does not use drugs.    Family History:  Family History   Problem Relation Name Age of Onset    Diabetes Mother      No Known Problems Father          Allergies:  Celecoxib and Codeine    Outpatient Medications:  Current Outpatient Medications   Medication Instructions    ascorbic acid (Vitamin C) 500 mg tablet 1 tablet, oral, Daily    aspirin 81 mg EC tablet 1 tablet, oral, Daily    atorvastatin (LIPITOR) 40 " mg, oral, Nightly    cholecalciferol (Vitamin D-3) 25 MCG (1000 UT) tablet 1 tablet, oral, Daily    docusate sodium (Stool Softener) 100 mg tablet 1 tablet, oral, Daily    gabapentin (NEURONTIN) 200 mg, oral, Nightly    lisinopriL-hydrochlorothiazide 20-12.5 mg tablet 1 tablet, oral, Daily    sennosides-docusate sodium (Luisana-Colace) 8.6-50 mg tablet 1 tablet, oral, Daily    tamsulosin (Flomax) 0.4 mg 24 hr capsule 1 capsule, oral, Daily       Physical Exam:  Physical Exam  Vitals reviewed.   Constitutional:       Appearance: Normal appearance.   Neck:      Vascular: No carotid bruit or JVD.   Cardiovascular:      Rate and Rhythm: Normal rate and regular rhythm.      Heart sounds: S1 normal and S2 normal. Murmur heard.      Systolic murmur is present with a grade of 1/6.   Pulmonary:      Effort: Pulmonary effort is normal.      Breath sounds: Normal breath sounds.   Abdominal:      General: Abdomen is flat. Bowel sounds are normal.      Palpations: Abdomen is soft.   Musculoskeletal:      Right lower leg: No edema.      Left lower leg: No edema.   Skin:     General: Skin is warm.   Neurological:      Mental Status: He is alert. Mental status is at baseline.   Psychiatric:         Mood and Affect: Mood normal.         Behavior: Behavior normal.           Last Labs:  CBC -  Lab Results   Component Value Date    WBC 4.5 12/22/2023    HGB 13.7 12/22/2023    HCT 41.4 12/22/2023    MCV 99 12/22/2023     12/22/2023       CMP -  Lab Results   Component Value Date    CALCIUM 9.6 12/22/2023    PROT 6.1 (L) 12/22/2023    ALBUMIN 4.1 12/22/2023    AST 30 12/22/2023    ALT 23 12/22/2023    ALKPHOS 74 12/22/2023    BILITOT 0.8 12/22/2023       LIPID PANEL -   Lab Results   Component Value Date    CHOL 125 12/22/2023    TRIG 52 12/22/2023    HDL 72.0 12/22/2023    CHHDL 1.7 12/22/2023    LDLF 47 07/05/2023    VLDL 10 12/22/2023    NHDL 53 12/22/2023       RENAL FUNCTION PANEL -   Lab Results   Component Value Date     "GLUCOSE 90 12/22/2023     12/22/2023    K 4.3 12/22/2023     12/22/2023    CO2 25 12/22/2023    ANIONGAP 14 12/22/2023    BUN 31 (H) 12/22/2023    CREATININE 1.15 12/22/2023    GFRMALE 79 07/05/2023    CALCIUM 9.6 12/22/2023    ALBUMIN 4.1 12/22/2023        Lab Results   Component Value Date    HGBA1C 5.7 06/02/2020       Last Cardiology Tests:  ECG:  No results found for this or any previous visit from the past 1095 days.      Echo:  No results found for this or any previous visit from the past 1095 days.      Ejection Fractions:  No results found for: \"EF\"    Cath:  No results found for this or any previous visit from the past 1095 days.      Stress Test:  No results found for this or any previous visit from the past 1095 days.      Cardiac Imaging:  No results found for this or any previous visit from the past 1095 days.          Assessment/Plan   In summary Mr. Emmanuel is a 88-year-old gentleman   He has asymptomatic Mobitz type I second-degree AV block. He will likely need pacemaker in future. He was advised to seek immediate medical attention if he develops dizziness, loss of consciousness, fatigue, shortness of breath, exercise intolerance or worsening ankle swelling.     He has aortic valve sclerosis with new murmur. Echo from Jan 2022- Mild AS.      Follow-up in 1 year or earlier if needed      Nba Garnica MD  "

## 2024-01-18 ENCOUNTER — OFFICE VISIT (OUTPATIENT)
Dept: PRIMARY CARE | Facility: CLINIC | Age: 89
End: 2024-01-18
Payer: COMMERCIAL

## 2024-01-18 VITALS
WEIGHT: 146 LBS | BODY MASS INDEX: 24.32 KG/M2 | DIASTOLIC BLOOD PRESSURE: 84 MMHG | SYSTOLIC BLOOD PRESSURE: 138 MMHG | RESPIRATION RATE: 16 BRPM | TEMPERATURE: 97.9 F | HEIGHT: 65 IN

## 2024-01-18 DIAGNOSIS — M15.0 PRIMARY GENERALIZED (OSTEO)ARTHRITIS: ICD-10-CM

## 2024-01-18 DIAGNOSIS — E78.2 MIXED HYPERLIPIDEMIA: ICD-10-CM

## 2024-01-18 DIAGNOSIS — I73.9 PVD (PERIPHERAL VASCULAR DISEASE) (CMS-HCC): ICD-10-CM

## 2024-01-18 DIAGNOSIS — R29.6 MULTIPLE FALLS: ICD-10-CM

## 2024-01-18 DIAGNOSIS — H92.01 EAR DISCOMFORT, RIGHT: ICD-10-CM

## 2024-01-18 DIAGNOSIS — M51.06 LUMBAR DISC DISORDER WITH MYELOPATHY: ICD-10-CM

## 2024-01-18 DIAGNOSIS — E55.9 VITAMIN D DEFICIENCY: ICD-10-CM

## 2024-01-18 DIAGNOSIS — I25.10 ATHEROSCLEROSIS OF NATIVE CORONARY ARTERY OF NATIVE HEART WITHOUT ANGINA PECTORIS: Primary | ICD-10-CM

## 2024-01-18 DIAGNOSIS — I10 ESSENTIAL (PRIMARY) HYPERTENSION: ICD-10-CM

## 2024-01-18 DIAGNOSIS — I10 BENIGN ESSENTIAL HYPERTENSION: ICD-10-CM

## 2024-01-18 PROCEDURE — 3079F DIAST BP 80-89 MM HG: CPT | Performed by: INTERNAL MEDICINE

## 2024-01-18 PROCEDURE — 3075F SYST BP GE 130 - 139MM HG: CPT | Performed by: INTERNAL MEDICINE

## 2024-01-18 PROCEDURE — 99214 OFFICE O/P EST MOD 30 MIN: CPT | Performed by: INTERNAL MEDICINE

## 2024-01-18 PROCEDURE — 1036F TOBACCO NON-USER: CPT | Performed by: INTERNAL MEDICINE

## 2024-01-18 PROCEDURE — 1159F MED LIST DOCD IN RCRD: CPT | Performed by: INTERNAL MEDICINE

## 2024-01-18 PROCEDURE — 1125F AMNT PAIN NOTED PAIN PRSNT: CPT | Performed by: INTERNAL MEDICINE

## 2024-01-18 PROCEDURE — 1160F RVW MEDS BY RX/DR IN RCRD: CPT | Performed by: INTERNAL MEDICINE

## 2024-01-18 RX ORDER — GABAPENTIN 100 MG/1
200 CAPSULE ORAL NIGHTLY
Qty: 60 CAPSULE | Refills: 4 | Status: SHIPPED | OUTPATIENT
Start: 2024-01-18

## 2024-01-18 SDOH — ECONOMIC STABILITY: FOOD INSECURITY: WITHIN THE PAST 12 MONTHS, THE FOOD YOU BOUGHT JUST DIDN'T LAST AND YOU DIDN'T HAVE MONEY TO GET MORE.: NEVER TRUE

## 2024-01-18 SDOH — ECONOMIC STABILITY: FOOD INSECURITY: WITHIN THE PAST 12 MONTHS, YOU WORRIED THAT YOUR FOOD WOULD RUN OUT BEFORE YOU GOT MONEY TO BUY MORE.: NEVER TRUE

## 2024-01-18 ASSESSMENT — LIFESTYLE VARIABLES
AUDIT-C TOTAL SCORE: 0
HOW MANY STANDARD DRINKS CONTAINING ALCOHOL DO YOU HAVE ON A TYPICAL DAY: PATIENT DOES NOT DRINK
HOW OFTEN DO YOU HAVE SIX OR MORE DRINKS ON ONE OCCASION: NEVER
SKIP TO QUESTIONS 9-10: 1
HOW OFTEN DO YOU HAVE A DRINK CONTAINING ALCOHOL: NEVER

## 2024-01-18 ASSESSMENT — PATIENT HEALTH QUESTIONNAIRE - PHQ9
2. FEELING DOWN, DEPRESSED OR HOPELESS: NOT AT ALL
1. LITTLE INTEREST OR PLEASURE IN DOING THINGS: NOT AT ALL
SUM OF ALL RESPONSES TO PHQ9 QUESTIONS 1 & 2: 0

## 2024-01-18 ASSESSMENT — ENCOUNTER SYMPTOMS
DEPRESSION: 0
OCCASIONAL FEELINGS OF UNSTEADINESS: 1
LOSS OF SENSATION IN FEET: 0

## 2024-01-18 NOTE — PROGRESS NOTES
"Chief Complaint/HPI:    Follow up :     Balance issues: patient had hearing tested, balance is not good, patient did go to PT already, it did help him somewhat. Patient did fall last week at [place], no injury occurred. He has not seen physical medicine. He does have a Med Scape alert monitor. Patient has some discomfort in the left ear.      Tingling of the right middle finger: patient saw orthopedics he states    Bloating, increased gas: Last visit complained of increased flatulence. BMs are once a day and soft. His constipation has been well controlled with Senna for years. He does eat a varied diet. \"I'm walking around and I start tootin\" patient states. Trial of simethicone last visit but this did not make much difference.      S/P Carotid endarterectomy: Completed in [place]. Patient has seen Dr Garnica for follow up. Patient saw cardiology last week.      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       OA of the knees/ shoulder: shoulders and knees, \"are the same\". He has had one shoulder injection in the past that helped somewhat. He is still taking Aleve, but he does not feel that it is strong enough.  \"This weather is killing me\".     HTN: patient takes lisinopril/ HCTZ.      Hyperlipidemia: Patient takes atorvastatin.      Elevated PSA: Patient is following with Dr Sprague. No change in tamsulosin, it is stable     ROS otherwise negative aside from what was mentioned above in HPI.      Patient Active Problem List   Diagnosis    Benign prostatic hyperplasia    Benign essential hypertension    Asymptomatic stenosis of right carotid artery    Aortic valve sclerosis    Allergic conjunctivitis of both eyes    Chronic pain of both shoulders    Flatulence    High prostate specific antigen (PSA)    Mixed hyperlipidemia    Eliazar (type) I ([place]'s) " atrioventricular block    Osteoarthritis    Other microscopic hematuria    PVD (peripheral vascular disease) (CMS/HCC)    Overgrown toenails    Right otitis externa    Arthritis of left glenohumeral joint    Systolic murmur    Sleep disorder    Seasonal allergic rhinitis    Chronic sinusitis    Rotator cuff arthropathy of right shoulder    Vitamin D deficiency    Cystic disease of kidney    Declining functional status    Enlarged prostate    Gastroesophageal reflux disease with esophagitis    History of elevated lipids    History of total knee replacement    Incarcerated inguinal hernia, unilateral    Irregular heartbeat    Lumbar disc disorder with myelopathy    Overweight with body mass index (BMI) 25.0-29.9    MSSA (methicillin susceptible Staphylococcus aureus) infection    Primary osteoarthritis of right knee    Essential (primary) hypertension    Left carotid stenosis    Carotid stenosis, right    Carotid artery stenosis    Carpal tunnel syndrome    Constipation, chronic    Primary generalized (osteo)arthritis    Osteoarthrosis    Mobitz type I incomplete atrioventricular block    Routine general medical examination at health care facility    Anemia, unspecified    Atherosclerotic heart disease of native coronary artery without angina pectoris    Bacteremia    Difficulty in walking, not elsewhere classified    Dysphagia, oropharyngeal phase    Major depressive disorder, single episode, unspecified    Muscle weakness (generalized)    Sepsis, unspecified organism (CMS/HCC)    Other polyosteoarthritis    Multiple falls    Impaired gait and mobility    Hand weakness    Finger stiffness    Chronic generalized pain         Past Medical History:   Diagnosis Date    Occlusion and stenosis of bilateral carotid arteries     Bilateral carotid artery stenosis    Personal history of diseases of the skin and subcutaneous tissue     History of seborrheic dermatitis    Personal history of other drug therapy 11/11/2019     "History of influenza vaccination    Personal history of other infectious and parasitic diseases     History of Clostridioides difficile infection     Past Surgical History:   Procedure Laterality Date    OTHER SURGICAL HISTORY  06/12/2019    Hernia repair    OTHER SURGICAL HISTORY  06/12/2019    Cataract surgery    OTHER SURGICAL HISTORY  06/12/2019    Knee replacement    OTHER SURGICAL HISTORY  06/12/2019    Carotid thromboendarterectomy     Social History     Social History Narrative    Not on file         ALLERGIES  Celecoxib and Codeine      MEDICATIONS  Current Outpatient Medications on File Prior to Visit   Medication Sig Dispense Refill    ascorbic acid (Vitamin C) 500 mg tablet Take 1 tablet (500 mg) by mouth once daily.      aspirin 81 mg EC tablet Take 1 tablet (81 mg) by mouth once daily.      atorvastatin (Lipitor) 40 mg tablet Take 1 tablet (40 mg) by mouth once daily at bedtime. 90 tablet 1    cholecalciferol (Vitamin D-3) 25 MCG (1000 UT) tablet Take 1 tablet (25 mcg) by mouth once daily.      lisinopriL-hydrochlorothiazide 20-12.5 mg tablet Take 1 tablet by mouth once daily. 90 tablet 1    tamsulosin (Flomax) 0.4 mg 24 hr capsule Take 1 capsule (0.4 mg) by mouth once daily.      [DISCONTINUED] gabapentin (Neurontin) 100 mg capsule TAKE 2 CAPSULES BY MOUTH ONCE DAILY AT BEDTIME 60 capsule 0    [DISCONTINUED] sennosides-docusate sodium (Luisana-Colace) 8.6-50 mg tablet Take 1 tablet by mouth once daily.       No current facility-administered medications on file prior to visit.         PHYSICAL EXAM  /84 (BP Location: Left arm, Patient Position: Sitting, BP Cuff Size: Adult)   Temp 36.6 °C (97.9 °F)   Resp 16   Ht 1.651 m (5' 5\")   Wt 66.2 kg (146 lb)   BMI 24.30 kg/m²   Body mass index is 24.3 kg/m².  CONSTITUTIONAL - well nourished, well developed, looks like stated age, in no acute distress, not ill-appearing, and not tired appearing, wearing back brace, walks with a cane  SKIN - normal skin " color and pigmentation, normal skin turgor without rash, lesions, or nodules visualized on exposed skin  HEAD - no trauma, normocephalic  EYES - extraocular muscles are intact  EARS -  auricles are intact, right TM appears to be scarred, no excessive cerumen noted, slight erythema of the superior aspect of the external canal  NECK - supple without rigidity, no neck mass was observed, no thyromegaly  CHEST - clear to auscultation, no wheezing, no crackles and no rales, good effort  CARDIAC - regular rate and regular rhythm, no skipped beats, 2/6 systolic murmur noted primarily over the RUSB , heart tones are distant, no obvious carotid bruits are noted  EXTREMITIES - trace edema right ankle, no deformities, no changes are noted.   NEUROLOGICAL - alert, oriented and no focal signs  PSYCHIATRIC - alert, pleasant and cordial, age-appropriate    ASSESSMENT/PLAN  Problem List Items Addressed This Visit       Benign essential hypertension    Current Assessment & Plan     BP is stable at present, no changes in med therapy today         Relevant Orders    Albumin , Urine Random    CBC and Auto Differential    Comprehensive Metabolic Panel    Mixed hyperlipidemia    Current Assessment & Plan     Stable on current med dose of atorvastatin         Relevant Orders    CBC and Auto Differential    Comprehensive Metabolic Panel    Lipid Panel    PVD (peripheral vascular disease) (CMS/[facility])    Relevant Orders    Referral to Physical Medicine Rehab    CBC and Auto Differential    Comprehensive Metabolic Panel    Vitamin D deficiency    Current Assessment & Plan     Continue vitamin d, recheck labs as ordered prior to next visit         Relevant Orders    CBC and Auto Differential    Comprehensive Metabolic Panel    Vitamin D 25-Hydroxy,Total (for eval of Vitamin D levels)    Lumbar disc disorder with myelopathy    Relevant Medications    gabapentin (Neurontin) 100 mg capsule    Other Relevant Orders    Referral to Physical Medicine  Rehab    CBC and Auto Differential    Comprehensive Metabolic Panel    Essential (primary) hypertension    Overview     Last Assessment & Plan: Formatting of this note might be different from the original. Well controlled. His Lisinopril has been reduced to 20 mg/day. He remains dizzy at times.         Relevant Orders    CBC and Auto Differential    Comprehensive Metabolic Panel    Primary generalized (osteo)arthritis    Current Assessment & Plan     Continue gabapentin for pain issue, patient has back pain, knee pain and is at risk for falls          Relevant Orders    Referral to Physical Medicine Rehab    CBC and Auto Differential    Comprehensive Metabolic Panel    Atherosclerotic heart disease of native coronary artery without angina pectoris - Primary    Relevant Orders    CBC and Auto Differential    Comprehensive Metabolic Panel    Lipid Panel    Multiple falls    Current Assessment & Plan     Trial of referral to physical medicine, see if this is at all helpful         Relevant Orders    Referral to Physical Medicine Rehab    CBC and Auto Differential    Comprehensive Metabolic Panel     Other Visit Diagnoses       Ear discomfort, right        Relevant Orders    Referral to ENT          Recheck labs in 5-6 months, see Physical Medicine for an assessment, follow up with cardiology also as scheduled      Acosta Jovel MD Patient was identified as a fall risk. Risk prevention instructions provided.  Patient is referred to Physical Medicine and Rehab for an evaluation,

## 2024-01-18 NOTE — PATIENT INSTRUCTIONS

## 2024-01-29 PROCEDURE — 87075 CULTR BACTERIA EXCEPT BLOOD: CPT

## 2024-01-29 PROCEDURE — 87070 CULTURE OTHR SPECIMN AEROBIC: CPT

## 2024-01-29 PROCEDURE — 87205 SMEAR GRAM STAIN: CPT

## 2024-01-30 ENCOUNTER — LAB REQUISITION (OUTPATIENT)
Dept: LAB | Facility: HOSPITAL | Age: 89
End: 2024-01-30
Payer: COMMERCIAL

## 2024-01-30 DIAGNOSIS — L97.522 NON-PRESSURE CHRONIC ULCER OF OTHER PART OF LEFT FOOT WITH FAT LAYER EXPOSED (MULTI): ICD-10-CM

## 2024-02-01 LAB
BACTERIA SPEC CULT: NORMAL
GRAM STN SPEC: NORMAL
GRAM STN SPEC: NORMAL

## 2024-05-03 DIAGNOSIS — I10 BENIGN ESSENTIAL HYPERTENSION: ICD-10-CM

## 2024-05-03 RX ORDER — LISINOPRIL AND HYDROCHLOROTHIAZIDE 12.5; 2 MG/1; MG/1
1 TABLET ORAL DAILY
Qty: 90 TABLET | Refills: 1 | Status: SHIPPED | OUTPATIENT
Start: 2024-05-03

## 2024-06-03 ENCOUNTER — APPOINTMENT (OUTPATIENT)
Dept: UROLOGY | Facility: CLINIC | Age: 89
End: 2024-06-03
Payer: COMMERCIAL

## 2024-06-15 DIAGNOSIS — E78.2 MIXED HYPERLIPIDEMIA: ICD-10-CM

## 2024-06-17 RX ORDER — ATORVASTATIN CALCIUM 40 MG/1
40 TABLET, FILM COATED ORAL NIGHTLY
Qty: 90 TABLET | Refills: 1 | Status: SHIPPED | OUTPATIENT
Start: 2024-06-17

## 2024-06-24 ENCOUNTER — APPOINTMENT (OUTPATIENT)
Dept: UROLOGY | Facility: CLINIC | Age: 89
End: 2024-06-24
Payer: COMMERCIAL

## 2024-07-01 DIAGNOSIS — M51.06 LUMBAR DISC DISORDER WITH MYELOPATHY: ICD-10-CM

## 2024-07-01 RX ORDER — GABAPENTIN 100 MG/1
200 CAPSULE ORAL NIGHTLY
Qty: 60 CAPSULE | Refills: 0 | Status: SHIPPED | OUTPATIENT
Start: 2024-07-01

## 2024-07-08 ENCOUNTER — LAB (OUTPATIENT)
Dept: LAB | Facility: LAB | Age: 89
End: 2024-07-08
Payer: COMMERCIAL

## 2024-07-08 DIAGNOSIS — E55.9 VITAMIN D DEFICIENCY: ICD-10-CM

## 2024-07-08 DIAGNOSIS — E78.2 MIXED HYPERLIPIDEMIA: ICD-10-CM

## 2024-07-08 DIAGNOSIS — I10 ESSENTIAL (PRIMARY) HYPERTENSION: ICD-10-CM

## 2024-07-08 DIAGNOSIS — R29.6 MULTIPLE FALLS: ICD-10-CM

## 2024-07-08 DIAGNOSIS — I73.9 PVD (PERIPHERAL VASCULAR DISEASE) (CMS-HCC): ICD-10-CM

## 2024-07-08 DIAGNOSIS — M51.06 LUMBAR DISC DISORDER WITH MYELOPATHY: ICD-10-CM

## 2024-07-08 DIAGNOSIS — I25.10 ATHEROSCLEROSIS OF NATIVE CORONARY ARTERY OF NATIVE HEART WITHOUT ANGINA PECTORIS: ICD-10-CM

## 2024-07-08 DIAGNOSIS — I10 BENIGN ESSENTIAL HYPERTENSION: ICD-10-CM

## 2024-07-08 DIAGNOSIS — M15.0 PRIMARY GENERALIZED (OSTEO)ARTHRITIS: ICD-10-CM

## 2024-07-08 LAB
ALBUMIN SERPL BCP-MCNC: 3.9 G/DL (ref 3.4–5)
ALP SERPL-CCNC: 59 U/L (ref 33–136)
ALT SERPL W P-5'-P-CCNC: 16 U/L (ref 10–52)
ANION GAP SERPL CALC-SCNC: 17 MMOL/L (ref 10–20)
AST SERPL W P-5'-P-CCNC: 23 U/L (ref 9–39)
BASOPHILS # BLD AUTO: 0.1 X10*3/UL (ref 0–0.1)
BASOPHILS NFR BLD AUTO: 1.2 %
BILIRUB SERPL-MCNC: 0.7 MG/DL (ref 0–1.2)
BUN SERPL-MCNC: 52 MG/DL (ref 6–23)
CALCIUM SERPL-MCNC: 10.4 MG/DL (ref 8.6–10.3)
CHLORIDE SERPL-SCNC: 103 MMOL/L (ref 98–107)
CHOLEST SERPL-MCNC: 120 MG/DL (ref 0–199)
CHOLESTEROL/HDL RATIO: 2.1
CO2 SERPL-SCNC: 21 MMOL/L (ref 21–32)
CREAT SERPL-MCNC: 1.6 MG/DL (ref 0.5–1.3)
CREAT UR-MCNC: 77.2 MG/DL (ref 20–370)
EGFRCR SERPLBLD CKD-EPI 2021: 41 ML/MIN/1.73M*2
EOSINOPHIL # BLD AUTO: 0.08 X10*3/UL (ref 0–0.4)
EOSINOPHIL NFR BLD AUTO: 1 %
ERYTHROCYTE [DISTWIDTH] IN BLOOD BY AUTOMATED COUNT: 13.9 % (ref 11.5–14.5)
GLUCOSE SERPL-MCNC: 86 MG/DL (ref 74–99)
HCT VFR BLD AUTO: 39.2 % (ref 41–52)
HDLC SERPL-MCNC: 56 MG/DL
HGB BLD-MCNC: 13 G/DL (ref 13.5–17.5)
IMM GRANULOCYTES # BLD AUTO: 0.02 X10*3/UL (ref 0–0.5)
IMM GRANULOCYTES NFR BLD AUTO: 0.2 % (ref 0–0.9)
LDLC SERPL CALC-MCNC: 45 MG/DL
LYMPHOCYTES # BLD AUTO: 0.65 X10*3/UL (ref 0.8–3)
LYMPHOCYTES NFR BLD AUTO: 7.9 %
MCH RBC QN AUTO: 32.3 PG (ref 26–34)
MCHC RBC AUTO-ENTMCNC: 33.2 G/DL (ref 32–36)
MCV RBC AUTO: 98 FL (ref 80–100)
MICROALBUMIN UR-MCNC: 388.6 MG/L
MICROALBUMIN/CREAT UR: 503.4 UG/MG CREAT
MONOCYTES # BLD AUTO: 0.36 X10*3/UL (ref 0.05–0.8)
MONOCYTES NFR BLD AUTO: 4.4 %
NEUTROPHILS # BLD AUTO: 6.97 X10*3/UL (ref 1.6–5.5)
NEUTROPHILS NFR BLD AUTO: 85.3 %
NON HDL CHOLESTEROL: 64 MG/DL (ref 0–149)
NRBC BLD-RTO: 0 /100 WBCS (ref 0–0)
PLATELET # BLD AUTO: 252 X10*3/UL (ref 150–450)
POTASSIUM SERPL-SCNC: 4.8 MMOL/L (ref 3.5–5.3)
PROT SERPL-MCNC: 6.3 G/DL (ref 6.4–8.2)
RBC # BLD AUTO: 4.02 X10*6/UL (ref 4.5–5.9)
SODIUM SERPL-SCNC: 136 MMOL/L (ref 136–145)
TRIGL SERPL-MCNC: 94 MG/DL (ref 0–149)
VLDL: 19 MG/DL (ref 0–40)
WBC # BLD AUTO: 8.2 X10*3/UL (ref 4.4–11.3)

## 2024-07-08 PROCEDURE — 82306 VITAMIN D 25 HYDROXY: CPT

## 2024-07-08 PROCEDURE — 82570 ASSAY OF URINE CREATININE: CPT

## 2024-07-08 PROCEDURE — 80061 LIPID PANEL: CPT

## 2024-07-08 PROCEDURE — 36415 COLL VENOUS BLD VENIPUNCTURE: CPT

## 2024-07-08 PROCEDURE — 82043 UR ALBUMIN QUANTITATIVE: CPT

## 2024-07-08 PROCEDURE — 85025 COMPLETE CBC W/AUTO DIFF WBC: CPT

## 2024-07-08 PROCEDURE — 80053 COMPREHEN METABOLIC PANEL: CPT

## 2024-07-09 LAB — 25(OH)D3 SERPL-MCNC: 57 NG/ML (ref 30–100)

## 2024-07-11 ENCOUNTER — HOSPITAL ENCOUNTER (OUTPATIENT)
Dept: RADIOLOGY | Facility: CLINIC | Age: 89
Discharge: HOME | End: 2024-07-11
Payer: COMMERCIAL

## 2024-07-11 ENCOUNTER — APPOINTMENT (OUTPATIENT)
Dept: PRIMARY CARE | Facility: CLINIC | Age: 89
End: 2024-07-11
Payer: COMMERCIAL

## 2024-07-11 ENCOUNTER — LAB (OUTPATIENT)
Dept: LAB | Facility: LAB | Age: 89
End: 2024-07-11
Payer: COMMERCIAL

## 2024-07-11 VITALS
DIASTOLIC BLOOD PRESSURE: 82 MMHG | OXYGEN SATURATION: 100 % | SYSTOLIC BLOOD PRESSURE: 132 MMHG | RESPIRATION RATE: 14 BRPM | BODY MASS INDEX: 20.66 KG/M2 | TEMPERATURE: 97.7 F | HEART RATE: 55 BPM | WEIGHT: 124 LBS | HEIGHT: 65 IN

## 2024-07-11 DIAGNOSIS — E78.2 MIXED HYPERLIPIDEMIA: ICD-10-CM

## 2024-07-11 DIAGNOSIS — R10.32 CHRONIC PAIN OF LEFT GROIN: ICD-10-CM

## 2024-07-11 DIAGNOSIS — M51.06 LUMBAR DISC DISORDER WITH MYELOPATHY: ICD-10-CM

## 2024-07-11 DIAGNOSIS — G89.29 CHRONIC PAIN OF LEFT GROIN: ICD-10-CM

## 2024-07-11 DIAGNOSIS — R63.4 WEIGHT LOSS, NON-INTENTIONAL: ICD-10-CM

## 2024-07-11 DIAGNOSIS — R97.20 HIGH PROSTATE SPECIFIC ANTIGEN (PSA): ICD-10-CM

## 2024-07-11 DIAGNOSIS — E55.9 VITAMIN D DEFICIENCY: ICD-10-CM

## 2024-07-11 DIAGNOSIS — E83.52 HYPERCALCEMIA: ICD-10-CM

## 2024-07-11 DIAGNOSIS — N28.9 RENAL INSUFFICIENCY: ICD-10-CM

## 2024-07-11 DIAGNOSIS — I10 ESSENTIAL (PRIMARY) HYPERTENSION: ICD-10-CM

## 2024-07-11 DIAGNOSIS — N28.9 RENAL INSUFFICIENCY: Primary | ICD-10-CM

## 2024-07-11 DIAGNOSIS — M54.50 BILATERAL LOW BACK PAIN WITHOUT SCIATICA, UNSPECIFIED CHRONICITY: ICD-10-CM

## 2024-07-11 DIAGNOSIS — N18.31 STAGE 3A CHRONIC KIDNEY DISEASE (MULTI): ICD-10-CM

## 2024-07-11 LAB
ANION GAP SERPL CALC-SCNC: 13 MMOL/L (ref 10–20)
BUN SERPL-MCNC: 56 MG/DL (ref 6–23)
CA-I BLD-SCNC: 1.34 MMOL/L (ref 1.1–1.33)
CALCIUM SERPL-MCNC: 9.8 MG/DL (ref 8.6–10.3)
CHLORIDE SERPL-SCNC: 103 MMOL/L (ref 98–107)
CO2 SERPL-SCNC: 25 MMOL/L (ref 21–32)
CREAT SERPL-MCNC: 1.35 MG/DL (ref 0.5–1.3)
EGFRCR SERPLBLD CKD-EPI 2021: 50 ML/MIN/1.73M*2
GLUCOSE SERPL-MCNC: 98 MG/DL (ref 74–99)
POTASSIUM SERPL-SCNC: 4.4 MMOL/L (ref 3.5–5.3)
PROT SERPL-MCNC: 6.5 G/DL (ref 6.4–8.2)
PTH-INTACT SERPL-MCNC: 30.2 PG/ML (ref 18.5–88)
SODIUM SERPL-SCNC: 137 MMOL/L (ref 136–145)
TSH SERPL-ACNC: 1.31 MIU/L (ref 0.44–3.98)

## 2024-07-11 PROCEDURE — 1125F AMNT PAIN NOTED PAIN PRSNT: CPT | Performed by: INTERNAL MEDICINE

## 2024-07-11 PROCEDURE — 84153 ASSAY OF PSA TOTAL: CPT

## 2024-07-11 PROCEDURE — 73502 X-RAY EXAM HIP UNI 2-3 VIEWS: CPT | Mod: LT

## 2024-07-11 PROCEDURE — 84165 PROTEIN E-PHORESIS SERUM: CPT

## 2024-07-11 PROCEDURE — 84155 ASSAY OF PROTEIN SERUM: CPT

## 2024-07-11 PROCEDURE — 36415 COLL VENOUS BLD VENIPUNCTURE: CPT

## 2024-07-11 PROCEDURE — 80048 BASIC METABOLIC PNL TOTAL CA: CPT

## 2024-07-11 PROCEDURE — 3075F SYST BP GE 130 - 139MM HG: CPT | Performed by: INTERNAL MEDICINE

## 2024-07-11 PROCEDURE — 99215 OFFICE O/P EST HI 40 MIN: CPT | Performed by: INTERNAL MEDICINE

## 2024-07-11 PROCEDURE — 73502 X-RAY EXAM HIP UNI 2-3 VIEWS: CPT | Mod: LEFT SIDE | Performed by: RADIOLOGY

## 2024-07-11 PROCEDURE — 86334 IMMUNOFIX E-PHORESIS SERUM: CPT

## 2024-07-11 PROCEDURE — 1159F MED LIST DOCD IN RCRD: CPT | Performed by: INTERNAL MEDICINE

## 2024-07-11 PROCEDURE — 82330 ASSAY OF CALCIUM: CPT

## 2024-07-11 PROCEDURE — 84443 ASSAY THYROID STIM HORMONE: CPT

## 2024-07-11 PROCEDURE — 84154 ASSAY OF PSA FREE: CPT

## 2024-07-11 PROCEDURE — 3079F DIAST BP 80-89 MM HG: CPT | Performed by: INTERNAL MEDICINE

## 2024-07-11 PROCEDURE — 83970 ASSAY OF PARATHORMONE: CPT

## 2024-07-11 PROCEDURE — 72110 X-RAY EXAM L-2 SPINE 4/>VWS: CPT

## 2024-07-11 PROCEDURE — 72110 X-RAY EXAM L-2 SPINE 4/>VWS: CPT | Performed by: RADIOLOGY

## 2024-07-11 PROCEDURE — 1036F TOBACCO NON-USER: CPT | Performed by: INTERNAL MEDICINE

## 2024-07-11 SDOH — ECONOMIC STABILITY: FOOD INSECURITY: WITHIN THE PAST 12 MONTHS, YOU WORRIED THAT YOUR FOOD WOULD RUN OUT BEFORE YOU GOT MONEY TO BUY MORE.: NEVER TRUE

## 2024-07-11 SDOH — ECONOMIC STABILITY: FOOD INSECURITY: WITHIN THE PAST 12 MONTHS, THE FOOD YOU BOUGHT JUST DIDN'T LAST AND YOU DIDN'T HAVE MONEY TO GET MORE.: NEVER TRUE

## 2024-07-11 ASSESSMENT — LIFESTYLE VARIABLES
SKIP TO QUESTIONS 9-10: 1
HOW OFTEN DO YOU HAVE A DRINK CONTAINING ALCOHOL: NEVER
AUDIT-C TOTAL SCORE: 0
HOW OFTEN DO YOU HAVE SIX OR MORE DRINKS ON ONE OCCASION: NEVER
HOW MANY STANDARD DRINKS CONTAINING ALCOHOL DO YOU HAVE ON A TYPICAL DAY: PATIENT DOES NOT DRINK

## 2024-07-11 ASSESSMENT — ENCOUNTER SYMPTOMS
OCCASIONAL FEELINGS OF UNSTEADINESS: 0
LOSS OF SENSATION IN FEET: 1

## 2024-07-11 ASSESSMENT — PAIN SCALES - GENERAL: PAINLEVEL: 10-WORST PAIN EVER

## 2024-07-11 NOTE — PROGRESS NOTES
"Chief Complaint/HPI:    Balance issues:  He does have a Med Scape alert monitor.  He does see cardiology, he denies shortness of breath.      Tingling of the right middle finger: patient saw orthopedics he states. Patient has difficulty moving the fingers, he has not seen orthopedics for some time. He has numbness of the right hand.      Bloating, increased gas: patient has not been eating right had some issues with loss of appetite. He has tried to start eating more again. He has lost weight, he now lives with granddaughter.       S/P Carotid endarterectomy: Completed in [place]. Patient has seen Dr Garnica for follow up.      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       Joint pain: the pain moves about, he develops left groin pain after wearing a tight belt, he develops severe back pain. Went to physical medicine, \"it did not turn out good\". Patient continues to have low back pain, he denies numbness in the legs. He has had both knees replaced.      HTN: patient takes lisinopril/ HCTZ.      Hyperlipidemia: Patient takes atorvastatin.      Elevated PSA:  No change in tamsulosin, he is supposed to follow up with Dr Sprague , he has no urinary issues.     ROS otherwise negative aside from what was mentioned above in HPI.      Patient Active Problem List   Diagnosis    Benign prostatic hyperplasia    Benign essential hypertension    Asymptomatic stenosis of right carotid artery    Aortic valve sclerosis    Allergic conjunctivitis of both eyes    Chronic pain of both shoulders    Flatulence    High prostate specific antigen (PSA)    Mixed hyperlipidemia    Mobitz (type) I ([place]kebach's) atrioventricular block    Osteoarthritis    Other microscopic hematuria    PVD (peripheral vascular disease) (CMS-[facility])    Overgrown toenails    Right otitis externa    Arthritis of left " glenohumeral joint    Systolic murmur    Sleep disorder    Seasonal allergic rhinitis    Chronic sinusitis    Rotator cuff arthropathy of right shoulder    Vitamin D deficiency    Cystic disease of kidney    Declining functional status    Enlarged prostate    Gastroesophageal reflux disease with esophagitis    History of elevated lipids    History of total knee replacement    Incarcerated inguinal hernia, unilateral    Irregular heartbeat    Lumbar disc disorder with myelopathy    Overweight with body mass index (BMI) 25.0-29.9    MSSA (methicillin susceptible Staphylococcus aureus) infection    Primary osteoarthritis of right knee    Essential (primary) hypertension    Left carotid stenosis    Carotid stenosis, right    Carotid artery stenosis    Carpal tunnel syndrome    Constipation, chronic    Primary generalized (osteo)arthritis    Osteoarthrosis    Mobitz type I incomplete atrioventricular block    Routine general medical examination at health care facility    Anemia, unspecified    Atherosclerotic heart disease of native coronary artery without angina pectoris    Bacteremia    Difficulty in walking, not elsewhere classified    Dysphagia, oropharyngeal phase    Major depressive disorder, single episode, unspecified    Muscle weakness (generalized)    Sepsis, unspecified organism (Multi)    Other polyosteoarthritis    Multiple falls    Impaired gait and mobility    Hand weakness    Finger stiffness    Chronic generalized pain    Renal insufficiency    Stage 3a chronic kidney disease (Multi)    Weight loss, non-intentional    Chronic pain of left groin         Past Medical History:   Diagnosis Date    Occlusion and stenosis of bilateral carotid arteries     Bilateral carotid artery stenosis    Personal history of diseases of the skin and subcutaneous tissue     History of seborrheic dermatitis    Personal history of other drug therapy 11/11/2019    History of influenza vaccination    Personal history of other  "infectious and parasitic diseases     History of Clostridioides difficile infection     Past Surgical History:   Procedure Laterality Date    OTHER SURGICAL HISTORY  06/12/2019    Hernia repair    OTHER SURGICAL HISTORY  06/12/2019    Cataract surgery    OTHER SURGICAL HISTORY  06/12/2019    Knee replacement    OTHER SURGICAL HISTORY  06/12/2019    Carotid thromboendarterectomy     Social History     Social History Narrative    Not on file         ALLERGIES  Celecoxib and Codeine      MEDICATIONS  Current Outpatient Medications on File Prior to Visit   Medication Sig Dispense Refill    ascorbic acid (Vitamin C) 500 mg tablet Take 1 tablet (500 mg) by mouth once daily.      aspirin 81 mg EC tablet Take 1 tablet (81 mg) by mouth once daily.      atorvastatin (Lipitor) 40 mg tablet TAKE 1 TABLET BY MOUTH ONCE DAILY AT BEDTIME 90 tablet 1    cholecalciferol (Vitamin D-3) 25 MCG (1000 UT) tablet Take 1 tablet (25 mcg) by mouth once daily.      gabapentin (Neurontin) 100 mg capsule TAKE 2 CAPSULES BY MOUTH ONCE DAILY AT BEDTIME 60 capsule 0    lisinopriL-hydrochlorothiazide 20-12.5 mg tablet Take 1 tablet by mouth once daily 90 tablet 1    tamsulosin (Flomax) 0.4 mg 24 hr capsule Take 1 capsule by mouth at bedtime 90 capsule 3     No current facility-administered medications on file prior to visit.         PHYSICAL EXAM  /82 (BP Location: Left arm, Patient Position: Sitting, BP Cuff Size: Adult)   Pulse 55   Temp 36.5 °C (97.7 °F)   Resp 14   Ht 1.651 m (5' 5\")   Wt 56.2 kg (124 lb)   SpO2 100%   BMI 20.63 kg/m²   Body mass index is 20.63 kg/m².  CONSTITUTIONAL - well nourished, well developed, looks like stated age, in no acute distress, not ill-appearing, and not tired appearing, wearing back brace, walks with a cane  SKIN - normal skin color and pigmentation, normal skin turgor without rash, lesions, or nodules visualized on exposed skin  HEAD - no trauma, normocephalic  EYES - extraocular muscles are " intact  EARS -  auricles are intact   NECK - supple without rigidity, no neck mass was observed, no thyromegaly, neck is thin  CHEST - clear to auscultation, no wheezing, no crackles and no rales, good effort  CARDIAC - regular rate and regular rhythm, no skipped beats, 2/6 systolic murmur noted primarily over the RUSB , heart tones are distant, no obvious carotid bruits are noted  EXTREMITIES - trace edema right ankle, no deformities, no changes are noted.   MSK - patient is thin, mild kyphosis is noted, tender in the left inguinal region, no palpable masses, moderate tenderness over the LS spine region also    NEUROLOGICAL - alert, oriented and no focal signs  PSYCHIATRIC - alert, pleasant and cordial, age-appropriate       ASSESSMENT/PLAN  Problem List Items Addressed This Visit       Chronic pain of left groin    Relevant Orders    Serum Protein Electrophoresis + Immunofixation    XR hip left with pelvis when performed 2 or 3 views    Essential (primary) hypertension    Current Assessment & Plan     BP is stable, no med changes needed, he has lost weight         High prostate specific antigen (PSA)    Current Assessment & Plan     PSA ordered         Relevant Orders    Serum Protein Electrophoresis + Immunofixation    PSA, total and free    Referral to Urology    Lumbar disc disorder with myelopathy    Relevant Orders    Serum Protein Electrophoresis + Immunofixation    XR lumbar spine complete 4+ views    Mixed hyperlipidemia    Current Assessment & Plan     Stable on current med therapy           Renal insufficiency - Primary    Relevant Orders    Basic metabolic panel    US renal complete    Stage 3a chronic kidney disease (Multi)    Current Assessment & Plan     This is new, concerned about acute renal insufficiency . Check labs ordered, refer to urology for follow up, check an US of the kidneys          Relevant Orders    Basic metabolic panel    Referral to Urology    Vitamin D deficiency    Current  Assessment & Plan     Patient has moderate back pain, check LS spine x ray, continue vitamin d         Weight loss, non-intentional    Current Assessment & Plan     This is concerning, patient has low back and left inguinal pain. Check an SPEP, check a PSA also, check an x ray of the LS spine and x ray of the left hip         Relevant Orders    TSH with reflex to Free T4 if abnormal     Other Visit Diagnoses       Hypercalcemia        Relevant Orders    Calcium, ionized    PTH, intact    Bilateral low back pain without sciatica, unspecified chronicity        Relevant Orders    XR lumbar spine complete 4+ views    XR hip left with pelvis when performed 2 or 3 views          Patient has noted increased taste for beer recently, he appears to be dehydrated, check labs    Follow up after testing, will refer to pain management after diagnostic evaluation    Acosta Jovel MD

## 2024-07-11 NOTE — ASSESSMENT & PLAN NOTE
This is new, concerned about acute renal insufficiency . Check labs ordered, refer to urology for follow up, check an US of the kidneys

## 2024-07-11 NOTE — ASSESSMENT & PLAN NOTE
This is concerning, patient has low back and left inguinal pain. Check an SPEP, check a PSA also, check an x ray of the LS spine and x ray of the left hip

## 2024-07-13 LAB
PSA FREE MFR SERPL: 23 %
PSA FREE SERPL-MCNC: 4.5 NG/ML
PSA SERPL IA-MCNC: 19.7 NG/ML (ref 0–4)

## 2024-07-16 LAB
ALBUMIN: 3.8 G/DL (ref 3.4–5)
ALPHA 1 GLOBULIN: 0.4 G/DL (ref 0.2–0.6)
ALPHA 2 GLOBULIN: 0.9 G/DL (ref 0.4–1.1)
BETA GLOBULIN: 0.7 G/DL (ref 0.5–1.2)
GAMMA GLOBULIN: 0.7 G/DL (ref 0.5–1.4)
IMMUNOFIXATION COMMENT: NORMAL
PATH REVIEW - SERUM IMMUNOFIXATION: NORMAL
PATH REVIEW-SERUM PROTEIN ELECTROPHORESIS: NORMAL
PROTEIN ELECTROPHORESIS COMMENT: NORMAL

## 2024-07-19 ENCOUNTER — APPOINTMENT (OUTPATIENT)
Dept: PRIMARY CARE | Facility: CLINIC | Age: 89
End: 2024-07-19
Payer: COMMERCIAL

## 2024-07-23 ENCOUNTER — APPOINTMENT (OUTPATIENT)
Dept: PRIMARY CARE | Facility: CLINIC | Age: 89
End: 2024-07-23
Payer: COMMERCIAL

## 2024-07-23 DIAGNOSIS — N28.9 RENAL INSUFFICIENCY: ICD-10-CM

## 2024-07-23 PROCEDURE — 76770 US EXAM ABDO BACK WALL COMP: CPT | Performed by: RADIOLOGY

## 2024-07-28 DIAGNOSIS — M51.06 LUMBAR DISC DISORDER WITH MYELOPATHY: ICD-10-CM

## 2024-07-30 RX ORDER — GABAPENTIN 100 MG/1
200 CAPSULE ORAL NIGHTLY
Qty: 60 CAPSULE | Refills: 1 | Status: SHIPPED | OUTPATIENT
Start: 2024-07-30

## 2024-08-08 ENCOUNTER — APPOINTMENT (OUTPATIENT)
Dept: UROLOGY | Facility: CLINIC | Age: 89
End: 2024-08-08
Payer: COMMERCIAL

## 2024-08-08 VITALS
HEIGHT: 65 IN | WEIGHT: 140 LBS | HEART RATE: 76 BPM | DIASTOLIC BLOOD PRESSURE: 70 MMHG | BODY MASS INDEX: 23.32 KG/M2 | SYSTOLIC BLOOD PRESSURE: 120 MMHG

## 2024-08-08 DIAGNOSIS — N40.1 BENIGN PROSTATIC HYPERPLASIA WITH LOWER URINARY TRACT SYMPTOMS, SYMPTOM DETAILS UNSPECIFIED: ICD-10-CM

## 2024-08-08 DIAGNOSIS — R97.20 ELEVATED PSA: Primary | ICD-10-CM

## 2024-08-08 LAB
POC BILIRUBIN, URINE: NEGATIVE
POC BLOOD, URINE: ABNORMAL
POC GLUCOSE, URINE: NEGATIVE MG/DL
POC KETONES, URINE: NEGATIVE MG/DL
POC LEUKOCYTES, URINE: NEGATIVE
POC NITRITE,URINE: NEGATIVE
POC PH, URINE: 5.5 PH
POC PROTEIN, URINE: ABNORMAL MG/DL
POC SPECIFIC GRAVITY, URINE: 1.02
POC UROBILINOGEN, URINE: 0.2 EU/DL

## 2024-08-08 PROCEDURE — 3078F DIAST BP <80 MM HG: CPT | Performed by: UROLOGY

## 2024-08-08 PROCEDURE — 1159F MED LIST DOCD IN RCRD: CPT | Performed by: UROLOGY

## 2024-08-08 PROCEDURE — 3074F SYST BP LT 130 MM HG: CPT | Performed by: UROLOGY

## 2024-08-08 PROCEDURE — 81003 URINALYSIS AUTO W/O SCOPE: CPT | Performed by: UROLOGY

## 2024-08-08 PROCEDURE — 1036F TOBACCO NON-USER: CPT | Performed by: UROLOGY

## 2024-08-08 PROCEDURE — 99213 OFFICE O/P EST LOW 20 MIN: CPT | Performed by: UROLOGY

## 2024-08-08 NOTE — PROGRESS NOTES
08/08/2024  Voiding okay, no weight loss no bone pain, on Flomax 0.4 mg daily    Patient has no nausea, no vomiting, no fever.    NILSA: Deferred    PVR: 105 cc    PSA up to 19.7, patient had prostate biopsy done 3 times negative in the past and refused to have another 1    We discussed elevated PSA, prostate biopsy versus monitoring PSA   We discussed benign prostate hypertrophy with mild to moderate voiding symptoms continue Flomax 0.4 mg daily  All the questions were answered, the patient expressed understanding and agreed to the plan.    We discussed the history of elevated PSA negative prostate biopsy  We discussed the benign prostate hypertrophy with mild voiding symptoms on Flomax 0.4 mg daily  We discussed the incident, fall today, Band-Aid applied to the right elbow area  All the questions were answered, the patient expressed understanding and agreed to the plan.     Impression  Elevated PSA   BPH stable     Plan  Continue Flomax 0.4 mg daily  Continue yearly watch PSA  Prostate biopsy declined again  Yearly NILSA and PSA        Chief Complaint   Patient presents with    Elevated PSA     Patient is here today for year follow up elevated and bph.        Physical Exam     TODAYS LAB RESULTS:  [id number]    Lab Results   Component Value Date    PSA 19.7 (H) 07/11/2024    PSA 10.44 (H) 06/02/2020      === 07/23/24 ===    US RENAL COMPLETE    - Impression -  Slightly echogenic kidneys suggesting medical renal disease.  Bilateral non-obstructing nephroliths. No hydronephrosis.    Enlarged prostate gland deforming the bladder base. Bladder stones  detected    MACRO:  None    Signed by: Issa Alarcon 7/24/2024 6:31 PM  Dictation workstation:   [id number]     OC Glucose, Urine  NEGATIVE mg/dl NEGATIVE   POC Bilirubin, Urine  NEGATIVE NEGATIVE   POC Ketones, Urine  NEGATIVE mg/dl NEGATIVE   POC Specific Gravity, Urine  1.005 - 1.035 1.025   POC Blood, Urine  NEGATIVE TRACE-Intact Abnormal    POC PH, Urine  No Reference  Range Established PH 5.5   POC Protein, Urine  NEGATIVE, 30 (1+) mg/dl 30 (1+)   POC Urobilinogen, Urine  0.2, 1.0 EU/DL 0.2   Poc Nitrite, Urine  NEGATIVE NEGATIVE   POC Leukocytes, Urine  NEGATIVE NEGATIVE         ASSESSMENT&PLAN:      IMPRESSIONS:      05/22/2023  87-year-old gentleman history of elevated PSA negative prostate biopsy many years ago, longstanding of BPH and voiding okay on Flomax 0.4 mg daily     Patient here today for year check of BPH, elevated PSA. He last saw Dr. Caraballo 4/14/23 and was to continue Tamsulosin 0.4mg daily. Per notes, patient continues to refuse TRUS prostate biopsy.   Patient overdue for PSA, order in system.   Last PSA 6/20/20 10.44     Denies dysuria, burning, hematuria. Nocturia x0-1 occasionally if drinking a lot of fluids before bed. Otherwise patient states he is doing very well, has no concerns.      Patient did fall in the parking lot today-- he states he did not hit his head or lose consciousness, only scraped his right elbow. EMS did come to evaluate patient as well.   -DALLINorgenstern CMA     We discussed the history of elevated PSA negative prostate biopsy  We discussed the benign prostate hypertrophy with mild voiding symptoms on Flomax 0.4 mg daily  We discussed the incident, fall today, Band-Aid applied to the right elbow area  All the questions were answered, the patient expressed understanding and agreed to the plan.     Impression  Elevated PSAâ€“stable  BPHâ€“stable     Plan  Continue Flomax 0.4 mg daily  Yearly appointment and NILSA        4/14/2023 [facility]   86-year-old male with a history of elevated PSA and bladder outlet obstruction presents for his yearly examination. The patient's primary addition obtained a PSA in 2020 which was greater than 10 the patient refused transrectal ultrasound and biopsy to rule out carcinoma and continues to refuse biopsy at this point. Patient states he is voiding without difficulty on tamsulosin 0.4 mg daily he has a good urinary  stream has nocturia x0-1 denies hesitancy or intermittency feels as though he empties his bladder completely denies blood or burning on urination.        PSA   7/11/2024 19.7  6/20/20 10.44    Surgery  Prostate biopsy negative x 3 in the past, done by Dr. Anderson

## 2024-09-28 DIAGNOSIS — M51.06 LUMBAR DISC DISORDER WITH MYELOPATHY: ICD-10-CM

## 2024-09-30 RX ORDER — GABAPENTIN 100 MG/1
200 CAPSULE ORAL NIGHTLY
Qty: 60 CAPSULE | Refills: 5 | Status: SHIPPED | OUTPATIENT
Start: 2024-09-30

## 2024-10-15 ENCOUNTER — APPOINTMENT (OUTPATIENT)
Dept: PRIMARY CARE | Facility: CLINIC | Age: 89
End: 2024-10-15
Payer: COMMERCIAL

## 2024-10-29 DIAGNOSIS — I10 BENIGN ESSENTIAL HYPERTENSION: ICD-10-CM

## 2024-10-29 RX ORDER — LISINOPRIL AND HYDROCHLOROTHIAZIDE 12.5; 2 MG/1; MG/1
1 TABLET ORAL DAILY
Qty: 90 TABLET | Refills: 1 | Status: SHIPPED | OUTPATIENT
Start: 2024-10-29

## 2024-12-08 DIAGNOSIS — E78.2 MIXED HYPERLIPIDEMIA: ICD-10-CM

## 2024-12-09 RX ORDER — ATORVASTATIN CALCIUM 40 MG/1
40 TABLET, FILM COATED ORAL NIGHTLY
Qty: 90 TABLET | Refills: 1 | Status: SHIPPED | OUTPATIENT
Start: 2024-12-09

## 2024-12-26 PROBLEM — H10.10 ALLERGIC CONJUNCTIVITIS: Status: ACTIVE | Noted: 2024-12-26

## 2024-12-26 PROBLEM — R26.9 ABNORMAL GAIT: Status: ACTIVE | Noted: 2023-11-13

## 2024-12-26 PROBLEM — I48.0 PAROXYSMAL ATRIAL FIBRILLATION (MULTI): Status: ACTIVE | Noted: 2024-12-26

## 2024-12-26 PROBLEM — M79.601 CHRONIC PAIN OF BOTH UPPER EXTREMITIES: Status: ACTIVE | Noted: 2023-07-12

## 2024-12-26 PROBLEM — M79.602 CHRONIC PAIN OF BOTH UPPER EXTREMITIES: Status: ACTIVE | Noted: 2023-07-12

## 2025-01-13 ENCOUNTER — TELEPHONE (OUTPATIENT)
Dept: CARDIOLOGY | Facility: HOSPITAL | Age: OVER 89
End: 2025-01-13
Payer: COMMERCIAL

## 2025-01-13 NOTE — TELEPHONE ENCOUNTER
Patient called in at this time to confirm appt time    Patient understood    Thank you!  Braxton FOX

## 2025-01-23 ENCOUNTER — APPOINTMENT (OUTPATIENT)
Dept: CARDIOLOGY | Facility: CLINIC | Age: OVER 89
End: 2025-01-23
Payer: COMMERCIAL

## 2025-01-23 VITALS
DIASTOLIC BLOOD PRESSURE: 70 MMHG | HEIGHT: 65 IN | WEIGHT: 130 LBS | HEART RATE: 76 BPM | BODY MASS INDEX: 21.66 KG/M2 | SYSTOLIC BLOOD PRESSURE: 110 MMHG

## 2025-01-23 DIAGNOSIS — I44.1 MOBITZ (TYPE) I (WENCKEBACH'S) ATRIOVENTRICULAR BLOCK: ICD-10-CM

## 2025-01-23 DIAGNOSIS — I35.8 AORTIC VALVE SCLEROSIS: ICD-10-CM

## 2025-01-23 DIAGNOSIS — I10 ESSENTIAL (PRIMARY) HYPERTENSION: Primary | ICD-10-CM

## 2025-01-23 LAB
ATRIAL RATE: 76 BPM
P AXIS: 63 DEGREES
P OFFSET: 124 MS
P ONSET: 68 MS
PR INTERVAL: 300 MS
Q ONSET: 218 MS
QRS COUNT: 12 BEATS
QRS DURATION: 108 MS
QT INTERVAL: 376 MS
QTC CALCULATION(BAZETT): 423 MS
QTC FREDERICIA: 406 MS
R AXIS: 99 DEGREES
T AXIS: 13 DEGREES
T OFFSET: 406 MS
VENTRICULAR RATE: 76 BPM

## 2025-01-23 PROCEDURE — 1159F MED LIST DOCD IN RCRD: CPT | Performed by: INTERNAL MEDICINE

## 2025-01-23 PROCEDURE — 3078F DIAST BP <80 MM HG: CPT | Performed by: INTERNAL MEDICINE

## 2025-01-23 PROCEDURE — 1160F RVW MEDS BY RX/DR IN RCRD: CPT | Performed by: INTERNAL MEDICINE

## 2025-01-23 PROCEDURE — 99214 OFFICE O/P EST MOD 30 MIN: CPT | Mod: 25 | Performed by: INTERNAL MEDICINE

## 2025-01-23 PROCEDURE — 93005 ELECTROCARDIOGRAM TRACING: CPT | Performed by: INTERNAL MEDICINE

## 2025-01-23 PROCEDURE — 3074F SYST BP LT 130 MM HG: CPT | Performed by: INTERNAL MEDICINE

## 2025-01-23 PROCEDURE — 99214 OFFICE O/P EST MOD 30 MIN: CPT | Performed by: INTERNAL MEDICINE

## 2025-01-23 PROCEDURE — 93010 ELECTROCARDIOGRAM REPORT: CPT | Performed by: INTERNAL MEDICINE

## 2025-01-23 PROCEDURE — 1036F TOBACCO NON-USER: CPT | Performed by: INTERNAL MEDICINE

## 2025-01-23 RX ORDER — SENNOSIDES 8.6 MG/1
1 TABLET ORAL DAILY
COMMUNITY

## 2025-01-23 RX ORDER — NAPROXEN 250 MG/1
250 TABLET ORAL DAILY PRN
COMMUNITY

## 2025-01-23 ASSESSMENT — ENCOUNTER SYMPTOMS
LOSS OF SENSATION IN FEET: 0
OCCASIONAL FEELINGS OF UNSTEADINESS: 0
DEPRESSION: 0

## 2025-01-23 NOTE — PROGRESS NOTES
"Chief Complaint:   Annual Exam (yearly)     History Of Present Illness:    Hubert Emmanuel is a 89 y.o. male presenting for annual follow-up accompanied by his granddaughter.    He has a past medical history of hypertension, hyperlipidemia and b/l carotid endarterectomy who was seen for Mobitz type 1 block on ECG . No history of TIA or CVA. Does not have chest pain or shortness of breath. Remains active and independent. No orthopnea, PND, palpitations, lightheadedness or loss of consciousness. He has balance problems and falls but denies losing consciousness or being dizzy. Has chronic ankle swelling.  He was in the ER in summer 2024 with strokelike symptoms and found to be extremely hypotensive.  Heart rate had appropriately increased.  I reviewed ER notes.  His blood pressure spontaneously improved there and he decided to leave AGAINST MEDICAL ADVICE.     Echo from 2020 showing aortic valve sclerosis normal LV function.      Last Recorded Vitals:  Vitals:    01/23/25 1039   BP: 110/70   BP Location: Right arm   Pulse: 76   Weight: 59 kg (130 lb)   Height: 1.651 m (5' 5\")       Past Medical History:  He has a past medical history of Occlusion and stenosis of bilateral carotid arteries, Personal history of diseases of the skin and subcutaneous tissue, Personal history of other drug therapy (11/11/2019), and Personal history of other infectious and parasitic diseases.    Past Surgical History:  He has a past surgical history that includes Other surgical history (06/12/2019); Other surgical history (06/12/2019); Other surgical history (06/12/2019); and Other surgical history (06/12/2019).      Social History:  He reports that he quit smoking about 35 years ago. His smoking use included cigarettes. He has never used smokeless tobacco. He reports that he does not currently use alcohol. He reports that he does not use drugs.    Family History:  Family History   Problem Relation Name Age of Onset    Diabetes Mother      No " Known Problems Father          Allergies:  Celecoxib and Codeine    Outpatient Medications:  Current Outpatient Medications   Medication Instructions    ascorbic acid (Vitamin C) 500 mg tablet 1 tablet, Daily    aspirin 81 mg EC tablet 1 tablet, Daily    atorvastatin (LIPITOR) 40 mg, oral, Nightly    cholecalciferol (Vitamin D-3) 25 MCG (1000 UT) tablet 1 tablet, Daily    gabapentin (NEURONTIN) 200 mg, oral, Nightly    lisinopriL-hydrochlorothiazide 20-12.5 mg tablet 1 tablet, oral, Daily    naproxen (NAPROSYN) 250 mg, oral, Daily PRN    sennosides (Senokot) 8.6 mg tablet 1 tablet, oral, Daily    tamsulosin (FLOMAX) 0.4 mg, oral, Nightly       Physical Exam:  Physical Exam  Vitals reviewed.   Constitutional:       Appearance: Normal appearance.   Neck:      Vascular: No carotid bruit or JVD.   Cardiovascular:      Rate and Rhythm: Normal rate and regular rhythm.      Heart sounds: S1 normal and S2 normal. Murmur heard.      Systolic murmur is present with a grade of 1/6.   Pulmonary:      Effort: Pulmonary effort is normal.      Breath sounds: Normal breath sounds.   Abdominal:      General: Abdomen is flat. Bowel sounds are normal.      Palpations: Abdomen is soft.   Musculoskeletal:      Right lower leg: No edema.      Left lower leg: No edema.   Skin:     General: Skin is warm.   Neurological:      Mental Status: He is alert. Mental status is at baseline.   Psychiatric:         Mood and Affect: Mood normal.         Behavior: Behavior normal.           Last Labs:  CBC -  Lab Results   Component Value Date    WBC 8.2 07/08/2024    HGB 13.0 (L) 07/08/2024    HCT 39.2 (L) 07/08/2024    MCV 98 07/08/2024     07/08/2024       CMP -  Lab Results   Component Value Date    CALCIUM 9.8 07/11/2024    PROT 6.5 07/11/2024    PROT 6.3 (L) 07/08/2024    ALBUMIN 3.9 07/08/2024    AST 23 07/08/2024    ALT 16 07/08/2024    ALKPHOS 59 07/08/2024    BILITOT 0.7 07/08/2024       LIPID PANEL -   Lab Results   Component Value  "Date    CHOL 120 07/08/2024    TRIG 94 07/08/2024    HDL 56.0 07/08/2024    CHHDL 2.1 07/08/2024    LDLF 47 07/05/2023    VLDL 19 07/08/2024    NHDL 64 07/08/2024       RENAL FUNCTION PANEL -   Lab Results   Component Value Date    GLUCOSE 98 07/11/2024     07/11/2024    K 4.4 07/11/2024     07/11/2024    CO2 25 07/11/2024    ANIONGAP 13 07/11/2024    BUN 56 (H) 07/11/2024    CREATININE 1.35 (H) 07/11/2024    GFRMALE 79 07/05/2023    CALCIUM 9.8 07/11/2024    ALBUMIN 3.9 07/08/2024        Lab Results   Component Value Date    HGBA1C 5.7 06/02/2020       Last Cardiology Tests:  ECG:  ECG 12 Lead 01/11/2024      Echo:  No results found for this or any previous visit from the past 1095 days.      Ejection Fractions:  No results found for: \"EF\"    Cath:  No results found for this or any previous visit from the past 1095 days.      Stress Test:  No results found for this or any previous visit from the past 1095 days.      Cardiac Imaging:  No results found for this or any previous visit from the past 1095 days.          Assessment/Plan     In summary Mr. Emmanuel is a 89-year-old gentleman   He has asymptomatic Mobitz type I second-degree AV block. He will likely need pacemaker in future. He was advised to seek immediate medical attention if he develops dizziness, loss of consciousness, fatigue, shortness of breath, exercise intolerance or worsening ankle swelling.   Episode in summer 2024 appears to be secondary to dehydration, with hypotension and tachycardia.     He has aortic valve sclerosis with new murmur. Echo from Jan 2022- Mild AS.      Follow-up in 1 year or earlier if needed       Nba Garnica MD  "

## 2025-01-29 ENCOUNTER — APPOINTMENT (OUTPATIENT)
Dept: PRIMARY CARE | Facility: CLINIC | Age: OVER 89
End: 2025-01-29
Payer: COMMERCIAL

## 2025-01-29 VITALS
SYSTOLIC BLOOD PRESSURE: 113 MMHG | WEIGHT: 132.4 LBS | DIASTOLIC BLOOD PRESSURE: 75 MMHG | HEIGHT: 65 IN | TEMPERATURE: 97.8 F | RESPIRATION RATE: 16 BRPM | BODY MASS INDEX: 22.06 KG/M2 | HEART RATE: 55 BPM | OXYGEN SATURATION: 99 %

## 2025-01-29 DIAGNOSIS — I44.1 MOBITZ (TYPE) I (WENCKEBACH'S) ATRIOVENTRICULAR BLOCK: ICD-10-CM

## 2025-01-29 DIAGNOSIS — H10.13 ALLERGIC CONJUNCTIVITIS OF BOTH EYES: ICD-10-CM

## 2025-01-29 DIAGNOSIS — Z86.59 HISTORY OF DEPRESSION: ICD-10-CM

## 2025-01-29 DIAGNOSIS — N18.31 STAGE 3A CHRONIC KIDNEY DISEASE (MULTI): ICD-10-CM

## 2025-01-29 DIAGNOSIS — G89.29 CHRONIC PAIN OF LEFT GROIN: ICD-10-CM

## 2025-01-29 DIAGNOSIS — R10.32 CHRONIC PAIN OF LEFT GROIN: ICD-10-CM

## 2025-01-29 DIAGNOSIS — I10 ESSENTIAL (PRIMARY) HYPERTENSION: ICD-10-CM

## 2025-01-29 DIAGNOSIS — R32 URINARY INCONTINENCE, UNSPECIFIED TYPE: ICD-10-CM

## 2025-01-29 DIAGNOSIS — I10 BENIGN ESSENTIAL HYPERTENSION: ICD-10-CM

## 2025-01-29 DIAGNOSIS — N40.1 BENIGN PROSTATIC HYPERPLASIA WITH WEAK URINARY STREAM: ICD-10-CM

## 2025-01-29 DIAGNOSIS — Z00.00 ROUTINE GENERAL MEDICAL EXAMINATION AT HEALTH CARE FACILITY: Primary | ICD-10-CM

## 2025-01-29 DIAGNOSIS — R26.2 DIFFICULTY IN WALKING, NOT ELSEWHERE CLASSIFIED: ICD-10-CM

## 2025-01-29 DIAGNOSIS — R39.12 BENIGN PROSTATIC HYPERPLASIA WITH WEAK URINARY STREAM: ICD-10-CM

## 2025-01-29 DIAGNOSIS — M51.06 LUMBAR DISC DISORDER WITH MYELOPATHY: ICD-10-CM

## 2025-01-29 DIAGNOSIS — E78.2 MIXED HYPERLIPIDEMIA: ICD-10-CM

## 2025-01-29 PROCEDURE — 1036F TOBACCO NON-USER: CPT | Performed by: INTERNAL MEDICINE

## 2025-01-29 PROCEDURE — 99214 OFFICE O/P EST MOD 30 MIN: CPT | Performed by: INTERNAL MEDICINE

## 2025-01-29 PROCEDURE — 1170F FXNL STATUS ASSESSED: CPT | Performed by: INTERNAL MEDICINE

## 2025-01-29 PROCEDURE — 3078F DIAST BP <80 MM HG: CPT | Performed by: INTERNAL MEDICINE

## 2025-01-29 PROCEDURE — G0439 PPPS, SUBSEQ VISIT: HCPCS | Performed by: INTERNAL MEDICINE

## 2025-01-29 PROCEDURE — 1125F AMNT PAIN NOTED PAIN PRSNT: CPT | Performed by: INTERNAL MEDICINE

## 2025-01-29 PROCEDURE — 90662 IIV NO PRSV INCREASED AG IM: CPT | Performed by: INTERNAL MEDICINE

## 2025-01-29 PROCEDURE — G0008 ADMIN INFLUENZA VIRUS VAC: HCPCS | Performed by: INTERNAL MEDICINE

## 2025-01-29 PROCEDURE — 1159F MED LIST DOCD IN RCRD: CPT | Performed by: INTERNAL MEDICINE

## 2025-01-29 PROCEDURE — 3074F SYST BP LT 130 MM HG: CPT | Performed by: INTERNAL MEDICINE

## 2025-01-29 RX ORDER — ATORVASTATIN CALCIUM 40 MG/1
40 TABLET, FILM COATED ORAL NIGHTLY
Qty: 90 TABLET | Refills: 1 | Status: SHIPPED | OUTPATIENT
Start: 2025-01-29

## 2025-01-29 RX ORDER — LISINOPRIL AND HYDROCHLOROTHIAZIDE 12.5; 2 MG/1; MG/1
1 TABLET ORAL DAILY
Qty: 90 TABLET | Refills: 1 | Status: SHIPPED | OUTPATIENT
Start: 2025-01-29

## 2025-01-29 RX ORDER — DIAPER,BRIEF,ADULT, DISPOSABLE
1 EACH MISCELLANEOUS 3 TIMES DAILY
Qty: 100 EACH | Refills: 11 | Status: SHIPPED | OUTPATIENT
Start: 2025-01-29

## 2025-01-29 RX ORDER — NEOMYCIN SULFATE, POLYMYXIN B SULFATE AND HYDROCORTISONE 3.5; 10000; 1 MG/ML; [USP'U]/ML; MG/ML
1 SUSPENSION OPHTHALMIC 4 TIMES DAILY
Qty: 7.5 ML | Refills: 0 | Status: SHIPPED | OUTPATIENT
Start: 2025-01-29 | End: 2025-02-08

## 2025-01-29 RX ORDER — NAPROXEN 250 MG/1
250 TABLET ORAL 3 TIMES DAILY PRN
Qty: 90 TABLET | Refills: 1 | Status: SHIPPED | OUTPATIENT
Start: 2025-01-29

## 2025-01-29 RX ORDER — GABAPENTIN 300 MG/1
300 CAPSULE ORAL 2 TIMES DAILY
Qty: 60 CAPSULE | Refills: 2 | Status: SHIPPED | OUTPATIENT
Start: 2025-01-29

## 2025-01-29 ASSESSMENT — ENCOUNTER SYMPTOMS
DEPRESSION: 0
LOSS OF SENSATION IN FEET: 1
OCCASIONAL FEELINGS OF UNSTEADINESS: 1

## 2025-01-29 ASSESSMENT — ACTIVITIES OF DAILY LIVING (ADL)
DRESSING: INDEPENDENT
BATHING: INDEPENDENT
TAKING_MEDICATION: INDEPENDENT
MANAGING_FINANCES: INDEPENDENT
DOING_HOUSEWORK: NEEDS ASSISTANCE
GROCERY_SHOPPING: TOTAL CARE

## 2025-01-29 ASSESSMENT — PATIENT HEALTH QUESTIONNAIRE - PHQ9
2. FEELING DOWN, DEPRESSED OR HOPELESS: NOT AT ALL
SUM OF ALL RESPONSES TO PHQ9 QUESTIONS 1 & 2: 0
1. LITTLE INTEREST OR PLEASURE IN DOING THINGS: NOT AT ALL

## 2025-01-29 ASSESSMENT — LIFESTYLE VARIABLES
HOW OFTEN DO YOU HAVE SIX OR MORE DRINKS ON ONE OCCASION: NEVER
AUDIT-C TOTAL SCORE: 0
SKIP TO QUESTIONS 9-10: 1
HOW OFTEN DO YOU HAVE A DRINK CONTAINING ALCOHOL: NEVER
HOW MANY STANDARD DRINKS CONTAINING ALCOHOL DO YOU HAVE ON A TYPICAL DAY: PATIENT DOES NOT DRINK

## 2025-01-29 ASSESSMENT — PAIN SCALES - GENERAL: PAINLEVEL_OUTOF10: 10-WORST PAIN EVER

## 2025-01-29 NOTE — ASSESSMENT & PLAN NOTE
Take 500 mg naproxen as needed for pain. Referred to see pain management for further evaluation. Patient has decreased movement of the left hip and leg. Will increase the dose of gabapentin to see if it is helpful    Orders:    CBC and Auto Differential; Future    Comprehensive Metabolic Panel; Future    naproxen (Naprosyn) 250 mg tablet; Take 1 tablet (250 mg) by mouth 3 times a day as needed for mild pain (1 - 3).    Referral to Pain Medicine; Future

## 2025-01-29 NOTE — ASSESSMENT & PLAN NOTE
Follows up with cardiology. No changes for now  Orders:    CBC and Auto Differential; Future    Comprehensive Metabolic Panel; Future

## 2025-01-29 NOTE — ASSESSMENT & PLAN NOTE
Orders:    gabapentin (Neurontin) 300 mg capsule; Take 1 capsule (300 mg) by mouth 2 times a day.    naproxen (Naprosyn) 250 mg tablet; Take 1 tablet (250 mg) by mouth 3 times a day as needed for mild pain (1 - 3).    Referral to Pain Medicine; Future

## 2025-01-29 NOTE — ASSESSMENT & PLAN NOTE
Medicare wellness exam completed today  Orders:    1 Year Follow Up In Primary Care - Wellness Exam; Future    CBC and Auto Differential; Future    Comprehensive Metabolic Panel; Future

## 2025-01-29 NOTE — PROGRESS NOTES
"Chief Complaint/HPI:    Balance issues:  He does have a [employer] alert monitor. He does see cardiology, he denies shortness of breath. He now lives with granddaughter. Patient ambulates with a cane, but not able to move around as well as before d/t worsening pain.    Incontinence: wearing Depends because he leaks out before he makes it to the bathroom during the day. He typically takes Flomax at night. Follows with Dr. Sprague who did not recommend any changes. Patient did have to pay out of pocket for depends and would like rx for these.    Joint pain: the pain moves about, he develops left groin pain after wearing a tight belt, he develops severe back pain. Went to physical medicine, \"it did not turn out good\". He Patient continues to have low back pain that radiates to the legs, he denies numbness in the legs. He has had both knees replaced. Symptoms are unchanged with Aleve and gabapentin.     Mobitz type 1 2nd degree heart block: patient sees Dr Garnica, patient apparently does not require a pacemaker now, recent not did suggest that the patient may need a pacer in the future. Patient has seen Dr Garnica for follow up.     Bilateral green drainage from eyes: Patient wakes up with sticky eyes with yellow-green discharge. Denies itchiness, sneezing, dry mouth.     Finger pain: Was told before by orthopedics that he has carpal tunnel and arthritis. Patient has difficulty moving the fingers, he has not seen orthopedics for some time. His pain is worst in the right index finger and has difficulty writing now.     Bowel concerns: Patient still has a good appetite but has been losing more weight over the past few months. Does not drink much water, mainly drinks black coffee. Bowel movements can alternate between constipation and diarrhea sometimes but overall states his bowels are not a concern.      S/P Carotid endarterectomy: Completed in [place].      HTN: patient takes lisinopril/ HCTZ.      Hyperlipidemia: " Patient takes atorvastatin.      Elevated PSA: No change in tamsulosin, follows up with Dr Sprague for yearly PSA checks.    ROS otherwise negative aside from what was mentioned above in HPI.      Patient Active Problem List   Diagnosis    Benign prostatic hyperplasia    Benign essential hypertension    Asymptomatic stenosis of right carotid artery    Aortic valve sclerosis    Allergic conjunctivitis of both eyes    Chronic pain of both upper extremities    Passing flatus    High prostate specific antigen (PSA)    Mixed hyperlipidemia    Mobitz (type) I (Wenckebach's) atrioventricular block    Osteoarthritis    Microscopic hematuria    Peripheral vascular disease (CMS-HCC)    Overgrown toenails    Right otitis externa    Arthritis of left glenohumeral joint    Systolic murmur    Sleep disorder    Seasonal allergic rhinitis    Chronic sinusitis    Rotator cuff arthropathy of right shoulder    Vitamin D deficiency    Cystic disease of kidney    Declining functional status    Enlarged prostate    Gastroesophageal reflux disease with esophagitis    History of depression    History of hypertension    History of elevated lipids    History of total knee replacement    Incarcerated inguinal hernia, unilateral    Irregular heartbeat    Lumbar disc disorder with myelopathy    Overweight with body mass index (BMI) 25.0-29.9    MSSA (methicillin susceptible Staphylococcus aureus) infection    Primary osteoarthritis of right knee    Essential (primary) hypertension    Left carotid stenosis    Carotid stenosis, right    Carotid artery stenosis    Carpal tunnel syndrome    Constipation, chronic    Primary generalized (osteo)arthritis    Osteoarthrosis    Mobitz type I incomplete atrioventricular block    Routine general medical examination at health care facility    Anemia, unspecified    Atherosclerotic heart disease of native coronary artery without angina pectoris    Bacteremia    Difficulty in walking, not elsewhere classified     Dysphagia, oropharyngeal phase    Major depressive disorder, single episode, unspecified    Muscle weakness (generalized)    Sepsis, unspecified organism (Multi)    Other polyosteoarthritis    Multiple falls    Abnormal gait    Weakness of hand    Stiffness of finger joint    Chronic generalized pain    Renal insufficiency    Stage 3a chronic kidney disease (Multi)    Weight loss, non-intentional    Chronic pain of left groin    Allergic conjunctivitis         Past Medical History:   Diagnosis Date    Occlusion and stenosis of bilateral carotid arteries     Bilateral carotid artery stenosis    Personal history of diseases of the skin and subcutaneous tissue     History of seborrheic dermatitis    Personal history of other drug therapy 11/11/2019    History of influenza vaccination    Personal history of other infectious and parasitic diseases     History of Clostridioides difficile infection     Past Surgical History:   Procedure Laterality Date    OTHER SURGICAL HISTORY  06/12/2019    Hernia repair    OTHER SURGICAL HISTORY  06/12/2019    Cataract surgery    OTHER SURGICAL HISTORY  06/12/2019    Knee replacement    OTHER SURGICAL HISTORY  06/12/2019    Carotid thromboendarterectomy     Social History     Social History Narrative    Not on file         ALLERGIES  Celecoxib and Codeine      MEDICATIONS  Current Outpatient Medications on File Prior to Visit   Medication Sig Dispense Refill    ascorbic acid (Vitamin C) 500 mg tablet Take 1 tablet (500 mg) by mouth once daily.      aspirin 81 mg EC tablet Take 1 tablet (81 mg) by mouth once daily.      atorvastatin (Lipitor) 40 mg tablet TAKE 1 TABLET BY MOUTH ONCE DAILY AT BEDTIME 90 tablet 1    cholecalciferol (Vitamin D-3) 25 MCG (1000 UT) tablet Take 1 tablet (25 mcg) by mouth once daily.      gabapentin (Neurontin) 100 mg capsule TAKE 2 CAPSULES BY MOUTH ONCE DAILY AT BEDTIME 60 capsule 5    lisinopriL-hydrochlorothiazide 20-12.5 mg tablet Take 1 tablet by mouth  "once daily 90 tablet 1    naproxen (Naprosyn) 250 mg tablet Take 1 tablet (250 mg) by mouth once daily as needed for mild pain (1 - 3).      sennosides (Senokot) 8.6 mg tablet Take 1 tablet (8.6 mg) by mouth once daily.      tamsulosin (Flomax) 0.4 mg 24 hr capsule Take 1 capsule by mouth at bedtime 90 capsule 3     No current facility-administered medications on file prior to visit.         PHYSICAL EXAM  /75 (BP Location: Left arm, Patient Position: Sitting, BP Cuff Size: Adult)   Pulse 55   Temp 36.6 °C (97.8 °F) (Temporal)   Resp 16   Ht 1.651 m (5' 5\")   Wt 60.1 kg (132 lb 6.4 oz)   SpO2 99%   BMI 22.03 kg/m²   Body mass index is 22.03 kg/m².  CONSTITUTIONAL - looks like stated age, in no acute distress, not ill-appearing, and not tired appearing, wearing back brace, walks with a cane  SKIN - normal skin color and pigmentation, normal skin turgor without rash, lesions, or nodules visualized on exposed skin  HEAD - no trauma, normocephalic  EYES - extraocular muscles are intact. Bilateral yellow eye drainage.  EARS -  auricles are intact   NECK - supple without rigidity, no neck mass was observed, no thyromegaly, neck is thin  CHEST - clear to auscultation, no wheezing, no crackles and no rales, good effort  CARDIAC - regular rate and regular rhythm, no skipped beats, 2/6 systolic murmur noted primarily over the RUSB , heart tones are distant, no obvious carotid bruits are noted  EXTREMITIES - trace edema to ankles, no deformities, no changes are noted.   MSK - patient is thin, mild kyphosis is noted, tender in the left inguinal region, no palpable masses, moderate tenderness over the LS spine region also    NEUROLOGICAL - alert, oriented and no focal signs  PSYCHIATRIC - alert, pleasant and cordial, age-appropriate       ASSESSMENT/PLAN  Problem List Items Addressed This Visit       Benign prostatic hyperplasia    Current Assessment & Plan     Send rx for chux pads and depends. Advised to see " urology if symptoms worsen.         Benign essential hypertension    Current Assessment & Plan     Continue current management of lisinopril-HCTZ         Allergic conjunctivitis of both eyes    Current Assessment & Plan     Send rx for antibiotic eye drops for bacterial conjunctivitis.         Mixed hyperlipidemia    Current Assessment & Plan     Continue current management atorvastatin 40 mg.         Mobitz (type) I (Wenckebach's) atrioventricular block - Primary    Current Assessment & Plan     Follows cardiology.         Difficulty in walking, not elsewhere classified    Current Assessment & Plan     Patient may benefit from physical therapy for strength training and better assistance with ADLs however patient declines.         Chronic pain of left groin    Current Assessment & Plan     Take 500 mg naproxen as needed for pain. Referred to see pain management for further evaluation.          Repeat blood work prior to next visit. Will refer to pain medicine for further management. Recommend follow up with Dr. Sprague for further management of incontinence.

## 2025-01-29 NOTE — ASSESSMENT & PLAN NOTE
Send rx for antibiotic eye drops for bacterial conjunctivitis. He has crusting of both eyes  Orders:    CBC and Auto Differential; Future    Comprehensive Metabolic Panel; Future    neomycin-polymyxin-HC (Cortisporin) 3.5-10,000-10 mg-unit-mg/mL ophthalmic suspension; Administer 1 drop into both eyes 4 times a day for 10 days.

## 2025-01-29 NOTE — ASSESSMENT & PLAN NOTE
Continue current management of lisinopril-hydrochlorothiazide, BP is controllede  Orders:    lisinopriL-hydrochlorothiazide 20-12.5 mg tablet; Take 1 tablet by mouth once daily.    Albumin-Creatinine Ratio, Urine Random; Future    CBC and Auto Differential; Future    Comprehensive Metabolic Panel; Future

## 2025-01-29 NOTE — PROGRESS NOTES
"Subjective   Reason for Visit: Hubert Emmanuel is an 89 y.o. male here for a Medicare Wellness visit.     Past Medical, Surgical, and Family History reviewed and updated in chart.    Reviewed all medications by prescribing practitioner or clinical pharmacist (such as prescriptions, OTCs, herbal therapies and supplements) and documented in the medical record.    HPI    Follow up and Medicare wellness exam: \" I'm not doing too good\"    Balance issues:  He does have a Med Sc[employer] alert monitor. He does see cardiology, he denies shortness of breath. He now lives with granddaughter. Patient ambulates with a cane, but not able to move around as well as before d/t worsening pain.     Incontinence: wearing Depends because he leaks out before he makes it to the bathroom during the day. He typically takes Flomax at night. Follows with Dr. Sprague who did not recommend any changes. Patient did have to pay out of pocket for depends and would like rx for these. PSA checked prior to last urology visit in 8/2024 was 19.7     Joint pain: the pain moves about, he develops left groin pain after wearing a tight belt, he develops severe back pain. Went to physical medicine, \"it did not turn out good\". He Patient continues to have low back pain that radiates to the legs, he denies numbness in the legs. He has had both knees replaced. Symptoms are unchanged with Aleve and gabapentin. Patient has throbbing pain in the left leg. Patient appears to have severe DJD of the spine on x rays      Mobitz type 1 2nd degree heart block: patient sees Dr Garnica, patient apparently does not require a pacemaker now, recent not did suggest that the patient may need a pacer in the future. Patient has seen Dr Garnica for follow up.      Bilateral green drainage from eyes: Patient wakes up with sticky eyes with yellow-green discharge. Denies itchiness, sneezing, dry mouth. Patient has had discharge from eyes for some time,  OTC drops have not been very " "effective     Finger pain: Was told before by orthopedics that he has carpal tunnel and arthritis. Patient has difficulty moving the fingers, he has not seen orthopedics for some time. His pain is worst in the right index finger and has difficulty writing now. Right index finger seems to be the most painful     Bowel concerns: Patient still has a good appetite but has been losing more weight over the past few months. Does not drink much water, mainly drinks black coffee. Bowel movements can alternate between constipation and diarrhea sometimes but overall states his bowels are not a concern.      S/P Carotid endarterectomy: Completed in [place].      HTN: patient takes lisinopril/ HCTZ.      Hyperlipidemia: Patient takes atorvastatin.      Elevated PSA: No change in tamsulosin, follows up with Dr Sprague for yearly PSA checks.    Patient Care Team:  Acosta Jovel MD as PCP - General (Internal Medicine)     Review of Systems     otherwise negative aside from what was mentioned above in HPI.     Objective   Vitals:  /75 (BP Location: Left arm, Patient Position: Sitting, BP Cuff Size: Adult)   Pulse 55   Temp 36.6 °C (97.8 °F) (Temporal)   Resp 16   Ht 1.651 m (5' 5\")   Wt 60.1 kg (132 lb 6.4 oz)   SpO2 99%   BMI 22.03 kg/m²       Physical Exam    CONSTITUTIONAL - looks like stated age, in no acute distress, not ill-appearing, and not tired appearing, wearing back brace, walks with a cane, accompanied by granddaughter  SKIN - normal skin color and pigmentation, normal skin turgor without rash, lesions, or nodules visualized on exposed skin  HEAD - no trauma, normocephalic  EYES - extraocular muscles are intact. Bilateral yellow eye drainage. Conjunctiva are red in appearance  EARS -  auricles are intact   NECK - supple without rigidity, no neck mass was observed, no thyromegaly, neck is thin  CHEST - clear to auscultation, no wheezing, no crackles and no rales, good effort  CARDIAC - regular rate and " regular rhythm, no skipped beats, 2/6 systolic murmur noted primarily over the RUSB , heart tones are distant, no obvious carotid bruits are noted  EXTREMITIES - trace edema to ankles bilaterally, no deformities  MSK - patient is thin, mild kyphosis is noted, tender in the left inguinal region, no palpable masses, mild tenderness over the LS spine region also, patient has difficulty lifting the left LE, he is able to move the foot without difficulty    NEUROLOGICAL - alert, oriented and no focal signs  PSYCHIATRIC - alert, pleasant and cordial, age-appropriate       Assessment & Plan  Mixed hyperlipidemia  Continue current management atorvastatin 40 mg. Check labs, they are due to be completed  Orders:    atorvastatin (Lipitor) 40 mg tablet; Take 1 tablet (40 mg) by mouth once daily at bedtime.    CBC and Auto Differential; Future    Comprehensive Metabolic Panel; Future    Lipid Panel; Future    Benign essential hypertension  Continue current management of lisinopril-hydrochlorothiazide, BP is controllede  Orders:    lisinopriL-hydrochlorothiazide 20-12.5 mg tablet; Take 1 tablet by mouth once daily.    Albumin-Creatinine Ratio, Urine Random; Future    CBC and Auto Differential; Future    Comprehensive Metabolic Panel; Future    Mobitz (type) I (Wenckebach's) atrioventricular block  Follows up with cardiology. No changes for now  Orders:    CBC and Auto Differential; Future    Comprehensive Metabolic Panel; Future    Allergic conjunctivitis of both eyes  Send rx for antibiotic eye drops for bacterial conjunctivitis. He has crusting of both eyes  Orders:    CBC and Auto Differential; Future    Comprehensive Metabolic Panel; Future    neomycin-polymyxin-HC (Cortisporin) 3.5-10,000-10 mg-unit-mg/mL ophthalmic suspension; Administer 1 drop into both eyes 4 times a day for 10 days.    Chronic pain of left groin  Take 500 mg naproxen as needed for pain. Referred to see pain management for further evaluation. Patient has  decreased movement of the left hip and leg. Will increase the dose of gabapentin to see if it is helpful    Orders:    CBC and Auto Differential; Future    Comprehensive Metabolic Panel; Future    naproxen (Naprosyn) 250 mg tablet; Take 1 tablet (250 mg) by mouth 3 times a day as needed for mild pain (1 - 3).    Referral to Pain Medicine; Future    Benign prostatic hyperplasia with weak urinary stream  Send rx for chux pads and depends. Advised to see urology for follow up   Orders:    CBC and Auto Differential; Future    Comprehensive Metabolic Panel; Future    incontinence pad, liner, disp pad; 1 each 3 times a day.    Adult Diapers    diaper,brief,adult,disposable (Breezers Adult Brief) misc; 1 each 3 times a day. Small/ medium size    Difficulty in walking, not elsewhere classified  Patient may benefit from physical therapy for strength training and better assistance with ADLs however patient declines. Will refer to pain management for assessment  Orders:    CBC and Auto Differential; Future    Comprehensive Metabolic Panel; Future    Referral to Pain Medicine; Future    Routine general medical examination at health care facility  Medicare wellness exam completed today  Orders:    1 Year Follow Up In Primary Care - Wellness Exam; Future    CBC and Auto Differential; Future    Comprehensive Metabolic Panel; Future    Essential (primary) hypertension  BP is controlled now       History of depression  He is stable       Lumbar disc disorder with myelopathy    Orders:    gabapentin (Neurontin) 300 mg capsule; Take 1 capsule (300 mg) by mouth 2 times a day.    naproxen (Naprosyn) 250 mg tablet; Take 1 tablet (250 mg) by mouth 3 times a day as needed for mild pain (1 - 3).    Referral to Pain Medicine; Future    Urinary incontinence, unspecified type    Orders:    incontinence pad, liner, disp pad; 1 each 3 times a day.    Adult Diapers    diaper,brief,adult,disposable (Breezers Adult Brief) misc; 1 each 3 times a  day. Small/ medium size    Stage 3a chronic kidney disease (Multi)  Stable, monitor        Follow up in 4-6 months

## 2025-01-29 NOTE — ASSESSMENT & PLAN NOTE
Continue current management atorvastatin 40 mg. Check labs, they are due to be completed  Orders:    atorvastatin (Lipitor) 40 mg tablet; Take 1 tablet (40 mg) by mouth once daily at bedtime.    CBC and Auto Differential; Future    Comprehensive Metabolic Panel; Future    Lipid Panel; Future

## 2025-01-29 NOTE — ASSESSMENT & PLAN NOTE
Send rx for chux pads and depends. Advised to see urology for follow up   Orders:    CBC and Auto Differential; Future    Comprehensive Metabolic Panel; Future    incontinence pad, liner, disp pad; 1 each 3 times a day.    Adult Diapers    diaper,brief,adult,disposable (Breezers Adult Brief) misc; 1 each 3 times a day. Small/ medium size

## 2025-01-29 NOTE — ASSESSMENT & PLAN NOTE
Patient may benefit from physical therapy for strength training and better assistance with ADLs however patient declines. Will refer to pain management for assessment  Orders:    CBC and Auto Differential; Future    Comprehensive Metabolic Panel; Future    Referral to Pain Medicine; Future

## 2025-01-30 PROBLEM — N39.46 MIXED STRESS AND URGE URINARY INCONTINENCE: Status: ACTIVE | Noted: 2025-01-30

## 2025-02-13 LAB
ALBUMIN SERPL-MCNC: 4.2 G/DL (ref 3.6–5.1)
ALBUMIN/CREAT UR: 97 MG/G CREAT
ALP SERPL-CCNC: 83 U/L (ref 35–144)
ALT SERPL-CCNC: 41 U/L (ref 9–46)
ANION GAP SERPL CALCULATED.4IONS-SCNC: 6 MMOL/L (CALC) (ref 7–17)
AST SERPL-CCNC: 43 U/L (ref 10–35)
BASOPHILS # BLD AUTO: 57 CELLS/UL (ref 0–200)
BASOPHILS NFR BLD AUTO: 0.8 %
BILIRUB SERPL-MCNC: 0.6 MG/DL (ref 0.2–1.2)
BUN SERPL-MCNC: 45 MG/DL (ref 7–25)
CALCIUM SERPL-MCNC: 10 MG/DL (ref 8.6–10.3)
CHLORIDE SERPL-SCNC: 104 MMOL/L (ref 98–110)
CHOLEST SERPL-MCNC: 122 MG/DL
CHOLEST/HDLC SERPL: 1.8 (CALC)
CO2 SERPL-SCNC: 27 MMOL/L (ref 20–32)
CREAT SERPL-MCNC: 1.07 MG/DL (ref 0.7–1.22)
CREAT UR-MCNC: 79 MG/DL (ref 20–320)
EGFRCR SERPLBLD CKD-EPI 2021: 66 ML/MIN/1.73M2
EOSINOPHIL # BLD AUTO: 142 CELLS/UL (ref 15–500)
EOSINOPHIL NFR BLD AUTO: 2 %
ERYTHROCYTE [DISTWIDTH] IN BLOOD BY AUTOMATED COUNT: 12.2 % (ref 11–15)
GLUCOSE SERPL-MCNC: 95 MG/DL (ref 65–99)
HCT VFR BLD AUTO: 34.8 % (ref 38.5–50)
HDLC SERPL-MCNC: 67 MG/DL
HGB BLD-MCNC: 11.8 G/DL (ref 13.2–17.1)
LDLC SERPL CALC-MCNC: 42 MG/DL (CALC)
LYMPHOCYTES # BLD AUTO: 1008 CELLS/UL (ref 850–3900)
LYMPHOCYTES NFR BLD AUTO: 14.2 %
MCH RBC QN AUTO: 33 PG (ref 27–33)
MCHC RBC AUTO-ENTMCNC: 33.9 G/DL (ref 32–36)
MCV RBC AUTO: 97.2 FL (ref 80–100)
MICROALBUMIN UR-MCNC: 7.7 MG/DL
MONOCYTES # BLD AUTO: 398 CELLS/UL (ref 200–950)
MONOCYTES NFR BLD AUTO: 5.6 %
NEUTROPHILS # BLD AUTO: 5495 CELLS/UL (ref 1500–7800)
NEUTROPHILS NFR BLD AUTO: 77.4 %
NONHDLC SERPL-MCNC: 55 MG/DL (CALC)
PLATELET # BLD AUTO: 171 THOUSAND/UL (ref 140–400)
PMV BLD REES-ECKER: 11.8 FL (ref 7.5–12.5)
POTASSIUM SERPL-SCNC: 4.7 MMOL/L (ref 3.5–5.3)
PROT SERPL-MCNC: 6.3 G/DL (ref 6.1–8.1)
RBC # BLD AUTO: 3.58 MILLION/UL (ref 4.2–5.8)
SODIUM SERPL-SCNC: 137 MMOL/L (ref 135–146)
TRIGL SERPL-MCNC: 47 MG/DL
WBC # BLD AUTO: 7.1 THOUSAND/UL (ref 3.8–10.8)

## 2025-03-10 ENCOUNTER — APPOINTMENT (OUTPATIENT)
Dept: PAIN MEDICINE | Facility: HOSPITAL | Age: OVER 89
End: 2025-03-10
Payer: COMMERCIAL

## 2025-04-09 ENCOUNTER — APPOINTMENT (OUTPATIENT)
Dept: PAIN MEDICINE | Facility: HOSPITAL | Age: OVER 89
End: 2025-04-09
Payer: COMMERCIAL

## 2025-05-08 ENCOUNTER — APPOINTMENT (OUTPATIENT)
Dept: PAIN MEDICINE | Facility: HOSPITAL | Age: OVER 89
End: 2025-05-08
Payer: COMMERCIAL

## 2025-05-14 ENCOUNTER — APPOINTMENT (OUTPATIENT)
Dept: PRIMARY CARE | Facility: CLINIC | Age: OVER 89
End: 2025-05-14
Payer: COMMERCIAL

## 2025-05-14 VITALS
BODY MASS INDEX: 22.57 KG/M2 | HEART RATE: 90 BPM | RESPIRATION RATE: 20 BRPM | DIASTOLIC BLOOD PRESSURE: 66 MMHG | HEIGHT: 65 IN | TEMPERATURE: 97.3 F | SYSTOLIC BLOOD PRESSURE: 86 MMHG | WEIGHT: 135.5 LBS | OXYGEN SATURATION: 97 %

## 2025-05-14 DIAGNOSIS — I10 BENIGN ESSENTIAL HYPERTENSION: ICD-10-CM

## 2025-05-14 DIAGNOSIS — R52 CHRONIC GENERALIZED PAIN: ICD-10-CM

## 2025-05-14 DIAGNOSIS — N40.1 BENIGN PROSTATIC HYPERPLASIA WITH WEAK URINARY STREAM: ICD-10-CM

## 2025-05-14 DIAGNOSIS — K59.00 CONSTIPATION, UNSPECIFIED CONSTIPATION TYPE: ICD-10-CM

## 2025-05-14 DIAGNOSIS — R60.0 EDEMA OF BOTH LOWER LEGS: ICD-10-CM

## 2025-05-14 DIAGNOSIS — R32 URINARY INCONTINENCE, UNSPECIFIED TYPE: ICD-10-CM

## 2025-05-14 DIAGNOSIS — E78.2 MIXED HYPERLIPIDEMIA: ICD-10-CM

## 2025-05-14 DIAGNOSIS — R39.12 BENIGN PROSTATIC HYPERPLASIA WITH WEAK URINARY STREAM: ICD-10-CM

## 2025-05-14 DIAGNOSIS — E55.9 VITAMIN D DEFICIENCY: Primary | ICD-10-CM

## 2025-05-14 DIAGNOSIS — G89.29 CHRONIC GENERALIZED PAIN: ICD-10-CM

## 2025-05-14 DIAGNOSIS — N18.31 STAGE 3A CHRONIC KIDNEY DISEASE (MULTI): ICD-10-CM

## 2025-05-14 PROCEDURE — 3078F DIAST BP <80 MM HG: CPT | Performed by: INTERNAL MEDICINE

## 2025-05-14 PROCEDURE — 3074F SYST BP LT 130 MM HG: CPT | Performed by: INTERNAL MEDICINE

## 2025-05-14 PROCEDURE — 1125F AMNT PAIN NOTED PAIN PRSNT: CPT | Performed by: INTERNAL MEDICINE

## 2025-05-14 PROCEDURE — 99214 OFFICE O/P EST MOD 30 MIN: CPT | Performed by: INTERNAL MEDICINE

## 2025-05-14 PROCEDURE — 1159F MED LIST DOCD IN RCRD: CPT | Performed by: INTERNAL MEDICINE

## 2025-05-14 PROCEDURE — 1036F TOBACCO NON-USER: CPT | Performed by: INTERNAL MEDICINE

## 2025-05-14 RX ORDER — DIAPER,BRIEF,ADULT, DISPOSABLE
1 EACH MISCELLANEOUS 4 TIMES DAILY PRN
Qty: 360 EACH | Refills: 2 | Status: SHIPPED | OUTPATIENT
Start: 2025-05-14

## 2025-05-14 RX ORDER — LACTULOSE 10 G/15ML
10 SOLUTION ORAL DAILY PRN
Qty: 473 ML | Refills: 1 | Status: SHIPPED | OUTPATIENT
Start: 2025-05-14 | End: 2025-07-16

## 2025-05-14 RX ORDER — ATORVASTATIN CALCIUM 40 MG/1
40 TABLET, FILM COATED ORAL NIGHTLY
Qty: 90 TABLET | Refills: 1 | Status: SHIPPED | OUTPATIENT
Start: 2025-05-14

## 2025-05-14 RX ORDER — FUROSEMIDE 20 MG/1
20 TABLET ORAL DAILY PRN
Qty: 30 TABLET | Refills: 2 | Status: SHIPPED | OUTPATIENT
Start: 2025-05-14 | End: 2026-05-14

## 2025-05-14 RX ORDER — LISINOPRIL 20 MG/1
20 TABLET ORAL DAILY
Qty: 100 TABLET | Refills: 3 | Status: SHIPPED | OUTPATIENT
Start: 2025-05-14 | End: 2026-06-18

## 2025-05-14 RX ORDER — LISINOPRIL AND HYDROCHLOROTHIAZIDE 12.5; 2 MG/1; MG/1
1 TABLET ORAL DAILY
Qty: 90 TABLET | Refills: 1 | Status: CANCELLED | OUTPATIENT
Start: 2025-05-14

## 2025-05-14 SDOH — ECONOMIC STABILITY: FOOD INSECURITY: WITHIN THE PAST 12 MONTHS, YOU WORRIED THAT YOUR FOOD WOULD RUN OUT BEFORE YOU GOT MONEY TO BUY MORE.: NEVER TRUE

## 2025-05-14 SDOH — ECONOMIC STABILITY: FOOD INSECURITY: WITHIN THE PAST 12 MONTHS, THE FOOD YOU BOUGHT JUST DIDN'T LAST AND YOU DIDN'T HAVE MONEY TO GET MORE.: NEVER TRUE

## 2025-05-14 ASSESSMENT — LIFESTYLE VARIABLES
SKIP TO QUESTIONS 9-10: 1
HOW MANY STANDARD DRINKS CONTAINING ALCOHOL DO YOU HAVE ON A TYPICAL DAY: PATIENT DOES NOT DRINK
HOW OFTEN DO YOU HAVE SIX OR MORE DRINKS ON ONE OCCASION: NEVER
AUDIT-C TOTAL SCORE: 0
HOW OFTEN DO YOU HAVE A DRINK CONTAINING ALCOHOL: NEVER

## 2025-05-14 ASSESSMENT — ENCOUNTER SYMPTOMS
LOSS OF SENSATION IN FEET: 1
OCCASIONAL FEELINGS OF UNSTEADINESS: 0
DEPRESSION: 0

## 2025-05-14 ASSESSMENT — ANXIETY QUESTIONNAIRES
IF YOU CHECKED OFF ANY PROBLEMS ON THIS QUESTIONNAIRE, HOW DIFFICULT HAVE THESE PROBLEMS MADE IT FOR YOU TO DO YOUR WORK, TAKE CARE OF THINGS AT HOME, OR GET ALONG WITH OTHER PEOPLE: NOT DIFFICULT AT ALL
2. NOT BEING ABLE TO STOP OR CONTROL WORRYING: NOT AT ALL
6. BECOMING EASILY ANNOYED OR IRRITABLE: NOT AT ALL
7. FEELING AFRAID AS IF SOMETHING AWFUL MIGHT HAPPEN: NOT AT ALL
4. TROUBLE RELAXING: NOT AT ALL
3. WORRYING TOO MUCH ABOUT DIFFERENT THINGS: NOT AT ALL
GAD7 TOTAL SCORE: 0
1. FEELING NERVOUS, ANXIOUS, OR ON EDGE: NOT AT ALL
5. BEING SO RESTLESS THAT IT IS HARD TO SIT STILL: NOT AT ALL

## 2025-05-14 ASSESSMENT — PAIN SCALES - GENERAL: PAINLEVEL_OUTOF10: 8

## 2025-05-14 NOTE — ASSESSMENT & PLAN NOTE
BP is low today, he may be dehydrated , stop lisinopril/ hydrochlorothiazide , continue lisinopril, add low dose furosemide for edema as needed

## 2025-05-14 NOTE — PROGRESS NOTES
"Chief Complaint/HPI:    Balance issues:  He does have a Med [employer] alert monitor. He does see cardiology, he denies shortness of breath. He now lives with granddaughter. Patient ambulates with a cane, but not able to move around as well as before d/t worsening pain. He has a pain management evaluation scheduled     Incontinence: wearing Depends because he leaks out before he makes it to the bathroom during the day. He typically takes Flomax at night. Follows with Dr. Sprague who did not recommend any changes. Patient did have to pay out of pocket for depends and would like rx for these. PSA checked prior to last urology visit in 8/2024 was 19.7     Joint pain: the pain moves about, he develops left groin pain after wearing a tight belt, he develops severe back pain. Went to physical medicine, \"it did not turn out good\". He Patient continues to have low back pain that radiates to the legs, he denies numbness in the legs. He has had both knees replaced. Symptoms are unchanged with Aleve and gabapentin. Patient has throbbing pain in the left leg. 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  Patient has pain management appointment scheduled next month     Mobitz type 1 2nd degree heart block: patient sees Dr Garnica, patient apparently does not require a pacemaker now,  Patient has seen Dr Garnica for follow up.      Finger pain: Was told before by orthopedics that he has carpal tunnel and arthritis. Patient has difficulty moving the fingers, he has not seen orthopedics for some time. His pain is worst in the right index finger and has difficulty writing now. Right index finger seems to be the most painful     Bowel concerns: patient generally has constipation , he has a BM every 2-3 days only. He still takes lisinopril/ hydrochlorothiazide      S/P Carotid endarterectomy: Completed in [place]. Patient continues to see vascular surgery, he does get intermittent right leg swelling     HTN: patient takes " "lisinopril/ HCTZ.  BP has been low recently     Hyperlipidemia: Patient takes atorvastatin.      Elevated PSA: No change in tamsulosin, follows up with Dr Sprague for yearly PSA checks.    ROS otherwise negative aside from what was mentioned above in HPI.      Problem List[1]      Medical History[2]  Surgical History[3]  Social History     Social History Narrative    Not on file         ALLERGIES  Celecoxib and Codeine      MEDICATIONS  Medications Ordered Prior to Encounter[4]      PHYSICAL EXAM  BP 86/66 (BP Location: Right arm, Patient Position: Sitting, BP Cuff Size: Adult)   Pulse 90   Temp 36.3 °C (97.3 °F) (Temporal)   Resp 20   Ht 1.651 m (5' 5\")   Wt 61.5 kg (135 lb 8 oz)   SpO2 97%   BMI 22.55 kg/m²   Body mass index is 22.55 kg/m².  CONSTITUTIONAL - looks like stated age, in no acute distress, not ill-appearing, and not tired appearing, wearing back brace, walks with a cane, accompanied by granddaughter  SKIN - normal skin color and pigmentation, normal skin turgor without rash, lesions, or nodules visualized on exposed skin  HEAD - no trauma, normocephalic  EYES - extraocular muscles are intact. Bilateral yellow eye drainage. Conjunctiva are red in appearance  EARS -  auricles are intact   NECK - supple without rigidity, no neck mass was observed, no thyromegaly, neck is thin  CHEST - clear to auscultation, no wheezing, no crackles and no rales, good effort  CARDIAC - regular rate and regular rhythm, no skipped beats, 2/6 systolic murmur noted primarily over the RUSB , heart tones are distant, no obvious carotid bruits are noted  EXTREMITIES - trace to 1+  edema to ankles bilaterally, no deformities  MSK - patient is thin, mild kyphosis is noted, tender in the left inguinal region, no palpable masses, mild tenderness over the LS spine region also, patient has difficulty lifting the left LE, he is able to move the foot without difficulty    NEUROLOGICAL - alert, oriented and no focal " signs  PSYCHIATRIC - alert, pleasant and cordial, age-appropriate  ASSESSMENT/PLAN  Problem List Items Addressed This Visit       Benign essential hypertension    Current Assessment & Plan   BP is low today, he may be dehydrated , stop lisinopril/ hydrochlorothiazide , continue lisinopril, add low dose furosemide for edema as needed         Relevant Medications    lisinopril 20 mg tablet    Other Relevant Orders    CBC and Auto Differential    Comprehensive Metabolic Panel    Albumin-Creatinine Ratio, Urine Random    Chronic generalized pain    Relevant Orders    CBC and Auto Differential    Comprehensive Metabolic Panel    Mixed hyperlipidemia    Relevant Medications    atorvastatin (Lipitor) 40 mg tablet    Other Relevant Orders    CBC and Auto Differential    Comprehensive Metabolic Panel    Lipid Panel    Stage 3a chronic kidney disease (Multi)    Current Assessment & Plan   Patient drinks a lot of coffee and 7 up. Try to remain hydrated.          Relevant Orders    CBC and Auto Differential    Comprehensive Metabolic Panel    Albumin-Creatinine Ratio, Urine Random    Vitamin D deficiency - Primary    Relevant Orders    CBC and Auto Differential    Comprehensive Metabolic Panel     Other Visit Diagnoses         Edema of both lower legs        Relevant Medications    furosemide (Lasix) 20 mg tablet    Other Relevant Orders    CBC and Auto Differential    Comprehensive Metabolic Panel      Constipation, unspecified constipation type        Relevant Medications    lactulose 20 gram/30 mL oral solution    Other Relevant Orders    CBC and Auto Differential    Comprehensive Metabolic Panel    TSH with reflex to Free T4 if abnormal          Check labs prior to next visit in 3 months, Depend underwear ordered    Acosta Jovel MD          [1]   Patient Active Problem List  Diagnosis    Benign prostatic hyperplasia    Benign essential hypertension    Asymptomatic stenosis of right carotid artery    Aortic valve  sclerosis    Allergic conjunctivitis of both eyes    Chronic pain of both upper extremities    Passing flatus    High prostate specific antigen (PSA)    Mixed hyperlipidemia    Mobitz (type) I (Wenckebach's) atrioventricular block    Osteoarthritis    Microscopic hematuria    Peripheral vascular disease (CMS-HCC)    Overgrown toenails    Right otitis externa    Arthritis of left glenohumeral joint    Systolic murmur    Sleep disorder    Seasonal allergic rhinitis    Chronic sinusitis    Rotator cuff arthropathy of right shoulder    Vitamin D deficiency    Cystic disease of kidney    Declining functional status    Enlarged prostate    Gastroesophageal reflux disease with esophagitis    History of depression    History of hypertension    History of elevated lipids    History of total knee replacement    Incarcerated inguinal hernia, unilateral    Irregular heartbeat    Lumbar disc disorder with myelopathy    Overweight with body mass index (BMI) 25.0-29.9    MSSA (methicillin susceptible Staphylococcus aureus) infection    Primary osteoarthritis of right knee    Essential (primary) hypertension    Left carotid stenosis    Carotid stenosis, right    Carotid artery stenosis    Carpal tunnel syndrome    Constipation, chronic    Primary generalized (osteo)arthritis    Osteoarthrosis    Mobitz type I incomplete atrioventricular block    Routine general medical examination at health care facility    Anemia, unspecified    Atherosclerotic heart disease of native coronary artery without angina pectoris    Bacteremia    Difficulty in walking, not elsewhere classified    Dysphagia, oropharyngeal phase    Major depressive disorder, single episode, unspecified    Muscle weakness (generalized)    Sepsis, unspecified organism (Multi)    Other polyosteoarthritis    Multiple falls    Abnormal gait    Weakness of hand    Stiffness of finger joint    Chronic generalized pain    Renal insufficiency    Stage 3a chronic kidney disease  (Multi)    Weight loss, non-intentional    Chronic pain of left groin    Allergic conjunctivitis    Mixed stress and urge urinary incontinence   [2]   Past Medical History:  Diagnosis Date    Occlusion and stenosis of bilateral carotid arteries     Bilateral carotid artery stenosis    Personal history of diseases of the skin and subcutaneous tissue     History of seborrheic dermatitis    Personal history of other drug therapy 11/11/2019    History of influenza vaccination    Personal history of other infectious and parasitic diseases     History of Clostridioides difficile infection   [3]   Past Surgical History:  Procedure Laterality Date    OTHER SURGICAL HISTORY  06/12/2019    Hernia repair    OTHER SURGICAL HISTORY  06/12/2019    Cataract surgery    OTHER SURGICAL HISTORY  06/12/2019    Knee replacement    OTHER SURGICAL HISTORY  06/12/2019    Carotid thromboendarterectomy   [4]   Current Outpatient Medications on File Prior to Visit   Medication Sig Dispense Refill    ascorbic acid (Vitamin C) 500 mg tablet Take 1 tablet (500 mg) by mouth once daily.      aspirin 81 mg EC tablet Take 1 tablet (81 mg) by mouth once daily.      cholecalciferol (Vitamin D-3) 25 MCG (1000 UT) tablet Take 1 tablet (25 mcg) by mouth once daily.      diaper,brief,adult,disposable (Breezers Adult Brief) misc 1 each 3 times a day. Small/ medium size 100 each 11    incontinence pad, liner, disp pad 1 each 3 times a day. 100 each 11    naproxen (Naprosyn) 250 mg tablet Take 1 tablet (250 mg) by mouth 3 times a day as needed for mild pain (1 - 3). 90 tablet 1    sennosides (Senokot) 8.6 mg tablet Take 1 tablet (8.6 mg) by mouth once daily.      tamsulosin (Flomax) 0.4 mg 24 hr capsule Take 1 capsule by mouth at bedtime 90 capsule 3    [DISCONTINUED] atorvastatin (Lipitor) 40 mg tablet Take 1 tablet (40 mg) by mouth once daily at bedtime. 90 tablet 1    [DISCONTINUED] lisinopriL-hydrochlorothiazide 20-12.5 mg tablet Take 1 tablet by mouth  once daily. 90 tablet 1    [DISCONTINUED] gabapentin (Neurontin) 300 mg capsule Take 1 capsule (300 mg) by mouth 2 times a day. (Patient not taking: Reported on 5/14/2025) 60 capsule 2     No current facility-administered medications on file prior to visit.

## 2025-06-11 ENCOUNTER — OFFICE VISIT (OUTPATIENT)
Dept: PAIN MEDICINE | Facility: HOSPITAL | Age: OVER 89
End: 2025-06-11
Payer: COMMERCIAL

## 2025-06-11 VITALS
RESPIRATION RATE: 16 BRPM | SYSTOLIC BLOOD PRESSURE: 129 MMHG | HEART RATE: 84 BPM | OXYGEN SATURATION: 98 % | WEIGHT: 129.4 LBS | HEIGHT: 65 IN | DIASTOLIC BLOOD PRESSURE: 72 MMHG | BODY MASS INDEX: 21.56 KG/M2

## 2025-06-11 DIAGNOSIS — R26.2 DIFFICULTY IN WALKING, NOT ELSEWHERE CLASSIFIED: ICD-10-CM

## 2025-06-11 DIAGNOSIS — M51.06 LUMBAR DISC DISORDER WITH MYELOPATHY: ICD-10-CM

## 2025-06-11 DIAGNOSIS — R10.32 CHRONIC PAIN OF LEFT GROIN: ICD-10-CM

## 2025-06-11 DIAGNOSIS — G89.29 CHRONIC PAIN OF LEFT GROIN: ICD-10-CM

## 2025-06-11 DIAGNOSIS — M15.0 PRIMARY GENERALIZED (OSTEO)ARTHRITIS: Primary | ICD-10-CM

## 2025-06-11 DIAGNOSIS — G56.01 CARPAL TUNNEL SYNDROME OF RIGHT WRIST: ICD-10-CM

## 2025-06-11 PROCEDURE — G2211 COMPLEX E/M VISIT ADD ON: HCPCS | Performed by: PHYSICAL MEDICINE & REHABILITATION

## 2025-06-11 PROCEDURE — 3078F DIAST BP <80 MM HG: CPT | Performed by: PHYSICAL MEDICINE & REHABILITATION

## 2025-06-11 PROCEDURE — 99214 OFFICE O/P EST MOD 30 MIN: CPT | Performed by: PHYSICAL MEDICINE & REHABILITATION

## 2025-06-11 PROCEDURE — 1125F AMNT PAIN NOTED PAIN PRSNT: CPT | Performed by: PHYSICAL MEDICINE & REHABILITATION

## 2025-06-11 PROCEDURE — 1036F TOBACCO NON-USER: CPT | Performed by: PHYSICAL MEDICINE & REHABILITATION

## 2025-06-11 PROCEDURE — 99204 OFFICE O/P NEW MOD 45 MIN: CPT | Performed by: PHYSICAL MEDICINE & REHABILITATION

## 2025-06-11 PROCEDURE — 1159F MED LIST DOCD IN RCRD: CPT | Performed by: PHYSICAL MEDICINE & REHABILITATION

## 2025-06-11 PROCEDURE — 3074F SYST BP LT 130 MM HG: CPT | Performed by: PHYSICAL MEDICINE & REHABILITATION

## 2025-06-11 RX ORDER — DULOXETIN HYDROCHLORIDE 30 MG/1
30 CAPSULE, DELAYED RELEASE ORAL DAILY
Qty: 30 CAPSULE | Refills: 2 | Status: SHIPPED | OUTPATIENT
Start: 2025-06-11

## 2025-06-11 ASSESSMENT — ENCOUNTER SYMPTOMS
CHILLS: 0
CONSTITUTIONAL NEGATIVE: 1
GASTROINTESTINAL NEGATIVE: 1
NUMBNESS: 1
FEVER: 0
COUGH: 0
SHORTNESS OF BREATH: 0
PALPITATIONS: 0
SORE THROAT: 0
MYALGIAS: 1
BACK PAIN: 1
ARTHRALGIAS: 1
WEAKNESS: 1
CHEST TIGHTNESS: 0

## 2025-06-11 ASSESSMENT — PAIN SCALES - GENERAL: PAINLEVEL_OUTOF10: 8

## 2025-06-11 NOTE — PROGRESS NOTES
Subjective   Patient ID: Hubert Emmanuel is a 89 y.o. male who presents for Pain.  HPI  Hubert Emmanuel is a 89 y.o. M with a past medical history significant for hypertension, hyperlipidemia, osteoarthritis who presents as a new patient referred by his primary care physician for generalized body pain.  Patient reports that the most significant pain is located in his right hand.  He reports that the pain is the worst in the right thumb index finger and middle finger.  He reports that the pain is sharp and is worse with any sort of movement.  He reports that if he does not move his fingers or his hand the pain is not present.  He reports that he was diagnosed with carpal tunnel syndrome in the past however he was supposed to get surgery but the surgeon ended up leaving the practice and he never ended up getting the surgery.  He reports he has numbness and tingling in those fingers as well.  Reports that the pain can be as bad as a 8 out of 10 in the right hand.  Patient reports that he also has generalized body pain in multiple joints throughout his body.  Pain is located in his back and both of his legs.  In the past he has trialed gabapentin and was on 100 mg once a day that was increased over time to 300 mg twice a day however it made him too sedated and his granddaughter found him at home drooling on himself thus they stopped the gabapentin.  Patient is here today with his granddaughter and she reports that the pain has been significantly impacting his quality life and his activities of daily living.  The patient is fairly mobile and does a lot of things throughout the house.  It has become difficult for him to hold things with his right hand to write into open jars and pick things up off the ground.  This is been going on for quite some time for over a few years.  They are here to discuss nonaddictive medications that would help his pain that do not make him too sedated.  He is also taking naproxen that does seem  to help his pain.  He also does take Tylenol from time to time.  He has not seen a hand surgeon in regards to his hand pain recently.  He does not use a wrist splint either.  He denies pain in his neck or radicular pain down his arms bilaterally.    He did work with OT for his hand pain, but it did not help.            Review of Systems   Constitutional: Negative.  Negative for chills and fever.   HENT:  Negative for sore throat.    Eyes:  Negative for visual disturbance.   Respiratory:  Negative for cough, chest tightness and shortness of breath.    Cardiovascular:  Negative for chest pain and palpitations.   Gastrointestinal: Negative.    Genitourinary: Negative.    Musculoskeletal:  Positive for arthralgias, back pain and myalgias.   Neurological:  Positive for weakness and numbness.        Current Outpatient Medications   Medication Instructions    ascorbic acid (Vitamin C) 500 mg tablet 1 tablet, Daily    aspirin 81 mg EC tablet 1 tablet, Daily    atorvastatin (LIPITOR) 40 mg, oral, Nightly    cholecalciferol (Vitamin D-3) 25 MCG (1000 UT) tablet 1 tablet, Daily    diaper,brief,adult,disposable (B[employer]ers Adult Brief) misc 1 each, miscellaneous, 3 times daily, Small/ medium size    diaper,brief,adult,disposable (Depend Underwear For Men [place]d) misc 1 each, miscellaneous, 4 times daily PRN    DULoxetine (CYMBALTA) 30 mg, oral, Daily, Do not crush or chew.    furosemide (LASIX) 20 mg, oral, Daily PRN    incontinence pad, liner, disp pad 1 each, miscellaneous, 3 times daily    lactulose 10 g, oral, Daily PRN    lisinopril 20 mg, oral, Daily    naproxen (NAPROSYN) 250 mg, oral, 3 times daily PRN    sennosides (Senokot) 8.6 mg tablet 1 tablet, Daily    tamsulosin (FLOMAX) 0.4 mg, oral, Nightly        Medical History[1]     Surgical History[2]     Family History[3]     RX Allergies[4]     No results found for this or any previous visit from the past 1000 days.      Objective     Vitals:    06/11/25 1426   BP: 129/72  "  Pulse: 84   Resp: 16   SpO2: 98%      Pain Score:   8        Physical Exam    GENERAL EXAM  Vital Signs: Vital signs to include heart rate, respiration rate, blood pressure, and temperature were reviewed.  General Appearance:  Awake, alert, healthy appearing, well developed, No acute distress.  Head: Normocephalic without evidence of head injury.  Neck: The appearance of the neck was normal without swelling with a midline trachea.  Eyes: The eyelids and eyebrows exhibited no abnormalities.  Pupils were not pin-point.  Sclera was without icterus.  Lungs: Respiration rhythm and depth was normal.  Respiratory movements were normal without labored breathing.  Cardiovascular: No peripheral edema was present.    Neurological: Patient was oriented to time, place, and person.  Speech was normal.  Balance, gait, and stance were unremarkable.    Psychiatric: Appearance was normal with appropriate dress.  Mood was euthymic and affect was normal.  Skin: Affected regions were without ecchymosis or skin lesions.    Upper extremity (MSK/neuro/skin):  Full active and passive range of motion in all planes  No edema/effusion/erythema  Skin: no skin lesions or scars    Physical exam as above except:  Weakness with handgrip on the right hand compared to the left hand  It is difficult for him to make the \"okay \"sign with his right index and thumb and he has nearly no strength with that he does have some allodynia in his thumb index and middle finger on the right hand  Radial pulse palpable bilaterally  No significant erythema or effusions noted in bilateral hands he does have some arthritic deformity in his  Index and middle finger on the right hand that is worse on the right than the left    Assessment/Plan   Problem List Items Addressed This Visit           ICD-10-CM       Gastrointestinal and Abdominal    Chronic pain of left groin R10.32, G89.29       Musculoskeletal and Injuries    Primary generalized (osteo)arthritis - Primary " M15.0    Relevant Medications    DULoxetine (Cymbalta) 30 mg DR capsule       Neuro    Lumbar disc disorder with myelopathy M51.06    Relevant Medications    DULoxetine (Cymbalta) 30 mg DR capsule    Carpal tunnel syndrome G56.00    Relevant Medications    DULoxetine (Cymbalta) 30 mg DR capsule    Other Relevant Orders    Referral to Orthopedics and Sports Medicine       Symptoms and Signs    Difficulty in walking, not elsewhere classified R26.2    Relevant Medications    DULoxetine (Cymbalta) 30 mg DR capsule     Hubert Emmanuel is a 89 y.o. M with a past medical history significant for hypertension, hyperlipidemia, osteoarthritis who presents as a new patient referred by his primary care physician for generalized body pain.  He does have a history of carpal tunnel on the right and was supposed to have surgery many years ago however he never ended up getting it. He is reporting that his pain is worse in his right hand particularly regarding his right index and middle finger.  His symptoms are consistent with carpal tunnel syndrome.  He also does have generalized body pain in multiple joints throughout his body that is significantly impacting his quality life and his activities of daily living he is fairly functional on his own and does do a lot of things around the house however recently has had significant difficulty with picking things up with his right hand and with fine motor skills with his right hand.  His GFR is 66 which is normal. We will start cymbalta 20mg daily. Wrist splint for hand, hand specialist referral.     Plan:  - Will start the patient on Cymbalta 20 mg daily.  We discussed most common side effect which is abdominal muscle cramping for the first couple days when starting it.  We discussed with the patient that he needs to consistently take this medication for a few weeks to have any sort of effect.  -Recommend wrist splint for the carpal tunnel in his right hand.  We discussed that he does not  need to wear this at all times however he can start by just wearing at nighttime to see if it helps  -Refer the patient over to our colleagues in the hand orthopedics department for consideration of carpal tunnel release  -Can continue to take up to 1000 mg of Tylenol 3 times daily as needed  -Follow-up in 2 months or sooner as needed    Sascha Rdz MD  [id number]  Interventional Pain Fellow         This note was generated with the aid of dictation software, there may be typos despite my attempts at proofreading.     I saw and evaluated the patient. I personally obtained the key and critical portions of the history and physical exam or was physically present for key and critical portions performed by the resident/fellow. I reviewed the resident/fellow's documentation and discussed the patient with the resident/fellow. I agree with the resident/fellow's medical decision making as documented in the note.    Issa Hall MD        [1]   Past Medical History:  Diagnosis Date    Occlusion and stenosis of bilateral carotid arteries     Bilateral carotid artery stenosis    Personal history of diseases of the skin and subcutaneous tissue     History of seborrheic dermatitis    Personal history of other drug therapy 11/11/2019    History of influenza vaccination    Personal history of other infectious and parasitic diseases     History of Clostridioides difficile infection   [2]   Past Surgical History:  Procedure Laterality Date    OTHER SURGICAL HISTORY  06/12/2019    Hernia repair    OTHER SURGICAL HISTORY  06/12/2019    Cataract surgery    OTHER SURGICAL HISTORY  06/12/2019    Knee replacement    OTHER SURGICAL HISTORY  06/12/2019    Carotid thromboendarterectomy   [3]   Family History  Problem Relation Name Age of Onset    Diabetes Mother      No Known Problems Father     [4]   Allergies  Allergen Reactions    Celecoxib Itching    Codeine Other

## 2025-06-20 DIAGNOSIS — G56.03 BILATERAL CARPAL TUNNEL SYNDROME: ICD-10-CM

## 2025-06-23 ENCOUNTER — OFFICE VISIT (OUTPATIENT)
Dept: ORTHOPEDIC SURGERY | Facility: HOSPITAL | Age: OVER 89
End: 2025-06-23
Payer: COMMERCIAL

## 2025-06-23 VITALS — HEIGHT: 65 IN | WEIGHT: 129 LBS | BODY MASS INDEX: 21.49 KG/M2

## 2025-06-23 DIAGNOSIS — G56.02 LEFT CARPAL TUNNEL SYNDROME: ICD-10-CM

## 2025-06-23 DIAGNOSIS — G56.01 CARPAL TUNNEL SYNDROME ON RIGHT: Primary | ICD-10-CM

## 2025-06-23 PROCEDURE — 1159F MED LIST DOCD IN RCRD: CPT | Performed by: ORTHOPAEDIC SURGERY

## 2025-06-23 PROCEDURE — 1036F TOBACCO NON-USER: CPT | Performed by: ORTHOPAEDIC SURGERY

## 2025-06-23 PROCEDURE — 1160F RVW MEDS BY RX/DR IN RCRD: CPT | Performed by: ORTHOPAEDIC SURGERY

## 2025-06-23 PROCEDURE — 99212 OFFICE O/P EST SF 10 MIN: CPT | Performed by: ORTHOPAEDIC SURGERY

## 2025-06-23 PROCEDURE — 99204 OFFICE O/P NEW MOD 45 MIN: CPT | Performed by: ORTHOPAEDIC SURGERY

## 2025-06-23 ASSESSMENT — ENCOUNTER SYMPTOMS
OCCASIONAL FEELINGS OF UNSTEADINESS: 1
LOSS OF SENSATION IN FEET: 0
DEPRESSION: 0

## 2025-06-23 NOTE — PROGRESS NOTES
89 y.o. male presents today for evaluation of bilateral hand numbness, tingling, weakness and pain. The patient reports symptoms for months, getting worse.  Pain is controlled.  Reports no previous surgeries, injections or trauma to the area.  Reports pain worse with use, better at rest.  Pain numb and tingly, wakes them from sleep.    Right worse than left.      Review of Systems    Constitutional: see HPI, no fever, no chills, not feeling tired, no significant weight gain or weight loss.   Eyes: No vision changes  ENT: no nosebleeds.   Cardiovascular: no chest pain.   Respiratory: no shortness of breath and no cough.   Gastrointestinal: no abdominal pain, no nausea, no vomiting and no diarrhea.   Musculoskeletal: per HPI  Neurological: no headache, no gait disturbances  Psychiatric: no depression and no sleep disturbances.   Endocrine: no muscle weakness and no muscle cramps.   Hematologic/Lymphatic: no swollen glands and no tendency for easy bruising or excessive swelling.     Patient's past medical history, past surgical history, allergies, and medications have been reviewed unless otherwise noted in the chart.     Carpal Tunnel Exam  Inspection:  no evidence of infection, no edema, no erythema, no ecchymosis, Palpation:  compartments are soft, no pain with palpation, Range of Motion:  full wrist and finger range of motion, Stability:  no wrist instability detected, Strength: 4/5 APB and intrinsics, Skin:  intact, Vascular:  capillary refill <2 seconds distally, Sensation:  decreased in the median nerve distribution, Test:  positive Tinel's at the Carpal Tunnel, positive Direct Carpal Tunnel Compression Test, severe wasting thenar eminence vasculature    Constitutional   General appearance: Alert and in no acute distress. Well developed, well nourished.    Eyes   External Eye, Conjunctiva and lids: Normal external exam - pupils were equal in size, round, reactive to light (PERRL) with normal accommodation and  extraocular movements intact (EOMI).   Ears, Nose, Mouth, and Throat   Hearing: Normal.   Neck   Neck: No neck mass was observed. Supple.   Pulmonary   Respiratory effort: No respiratory distress.   Cardiovascular   Examination of extremities: No peripheral edema.   Psychiatric   Judgment and insight: Intact.   Orientation to person, place, and time: Alert and oriented x 3.       Mood and affect: Normal.      Bilateral carpal tunnel syndrome  Based on the history, physical exam and imaging studies above, the patient's presentation is consistent with the above diagnosis.  I had a long discussion with the patient regarding their presentation and the treatment options.  We discussed initial nonoperative versus operative management options as well as potential further diagnostic imaging.  We again discussed her treatment options going forward along with their associated risks and benefits. After thorough discussion, the patient has elected to proceed with conservative management. All questions were answered to the patients satisfaction who seems satisfied with the plan.  They will call the office with any questions/concerns.    Night splints  EMG  Follow-up after EMG no x-ray

## 2025-06-23 NOTE — PROGRESS NOTES
NPV Referred by Dr. Issa Hall MD Bilateral wrist pain/Carpal Tunnel Syndrome   Patient having some reoccurring pain in both wrist right worse then left. Patient was scheduled years ago for Right ECTR however never ended up having the surgery.

## 2025-07-09 ENCOUNTER — HOSPITAL ENCOUNTER (EMERGENCY)
Facility: HOSPITAL | Age: OVER 89
Discharge: HOME | End: 2025-07-09
Attending: STUDENT IN AN ORGANIZED HEALTH CARE EDUCATION/TRAINING PROGRAM
Payer: COMMERCIAL

## 2025-07-09 ENCOUNTER — HOME HEALTH ADMISSION (OUTPATIENT)
Dept: HOME HEALTH SERVICES | Facility: HOME HEALTH | Age: OVER 89
End: 2025-07-09
Payer: COMMERCIAL

## 2025-07-09 ENCOUNTER — TELEPHONE (OUTPATIENT)
Dept: PRIMARY CARE | Facility: CLINIC | Age: OVER 89
End: 2025-07-09
Payer: COMMERCIAL

## 2025-07-09 ENCOUNTER — APPOINTMENT (OUTPATIENT)
Dept: RADIOLOGY | Facility: HOSPITAL | Age: OVER 89
End: 2025-07-09
Payer: COMMERCIAL

## 2025-07-09 VITALS
DIASTOLIC BLOOD PRESSURE: 73 MMHG | RESPIRATION RATE: 16 BRPM | BODY MASS INDEX: 20.83 KG/M2 | SYSTOLIC BLOOD PRESSURE: 113 MMHG | HEIGHT: 64 IN | OXYGEN SATURATION: 100 % | TEMPERATURE: 97.7 F | HEART RATE: 83 BPM | WEIGHT: 122 LBS
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
## 2025-07-09 DIAGNOSIS — S09.90XA CLOSED HEAD INJURY, INITIAL ENCOUNTER: Primary | ICD-10-CM

## 2025-07-09 DIAGNOSIS — E46 PROTEIN-CALORIE MALNUTRITION, UNSPECIFIED SEVERITY (MULTI): ICD-10-CM

## 2025-07-09 DIAGNOSIS — R63.4 WEIGHT LOSS, NON-INTENTIONAL: ICD-10-CM

## 2025-07-09 DIAGNOSIS — G47.9 SLEEP DISORDER: ICD-10-CM

## 2025-07-09 PROCEDURE — 70450 CT HEAD/BRAIN W/O DYE: CPT

## 2025-07-09 PROCEDURE — 2500000004 HC RX 250 GENERAL PHARMACY W/ HCPCS (ALT 636 FOR OP/ED): Performed by: STUDENT IN AN ORGANIZED HEALTH CARE EDUCATION/TRAINING PROGRAM

## 2025-07-09 PROCEDURE — 70450 CT HEAD/BRAIN W/O DYE: CPT | Performed by: INTERNAL MEDICINE

## 2025-07-09 PROCEDURE — 72125 CT NECK SPINE W/O DYE: CPT

## 2025-07-09 PROCEDURE — 90471 IMMUNIZATION ADMIN: CPT | Performed by: STUDENT IN AN ORGANIZED HEALTH CARE EDUCATION/TRAINING PROGRAM

## 2025-07-09 PROCEDURE — 90715 TDAP VACCINE 7 YRS/> IM: CPT | Performed by: STUDENT IN AN ORGANIZED HEALTH CARE EDUCATION/TRAINING PROGRAM

## 2025-07-09 PROCEDURE — 99284 EMERGENCY DEPT VISIT MOD MDM: CPT | Mod: 25 | Performed by: STUDENT IN AN ORGANIZED HEALTH CARE EDUCATION/TRAINING PROGRAM

## 2025-07-09 PROCEDURE — 72125 CT NECK SPINE W/O DYE: CPT | Performed by: INTERNAL MEDICINE

## 2025-07-09 RX ADMIN — TETANUS TOXOID, REDUCED DIPHTHERIA TOXOID AND ACELLULAR PERTUSSIS VACCINE, ADSORBED 0.5 ML: 5; 2.5; 8; 8; 2.5 SUSPENSION INTRAMUSCULAR at 05:53

## 2025-07-09 ASSESSMENT — PAIN SCALES - GENERAL
PAINLEVEL_OUTOF10: 0 - NO PAIN

## 2025-07-09 ASSESSMENT — PAIN - FUNCTIONAL ASSESSMENT: PAIN_FUNCTIONAL_ASSESSMENT: 0-10

## 2025-07-09 NOTE — HH CARE COORDINATION
Home Care received a Referral for Physical Therapy, Occupational Therapy, and Registered Dietician. We have processed the referral for a Start of Care on 7/11/2025.     If you have any questions or concerns, please feel free to contact us at [phone]. Follow the prompts, enter your five digit zip code, and you will be directed to your care team on [facility].

## 2025-07-09 NOTE — ED TRIAGE NOTES
Pt arrived via EMS from home for a fall. Pt stated he rolled out of bed and fell onto the ground. He hit his head on the lamp on the way down resulting in a small head lac. -thinners, -loc. Pt denies any pain. A+Ox4. Per EMS pt has had multiple recent falls.

## 2025-07-09 NOTE — PROGRESS NOTES
Patient care signed out to me by Dr. Engle.  Plan to follow-up imaging.  CT head and C-spine read as negative for any acute findings.  Discussed findings with patient and family in the room who have concerned about patient's decline.  Will refer to home health services.    No indication for admission.  Discussed findings and diagnosis with the patient, follow-up and return to ED precautions given, patient voiced understanding, agrees with plan, questions answered, patient was discharged in stable condition.    MDM/ED Course  Diagnoses as of 07/09/25 0938   Closed head injury, initial encounter   Fall, initial encounter

## 2025-07-09 NOTE — ED PROVIDER NOTES
Emergency Department Provider Note       History of Present Illness     History provided by: Patient  Limitations to History: None  External Records Reviewed with Brief Summary: None    HPI:  Hubert Emmanuel is a 89 y.o. male presents the ED with trauma to the head.  Patient says he accidentally rolled out of bed hitting the forehead.  Denies syncope.  No blood thinner use.  Denying any other injury.  No chest pain, shortness of breath, abdominal pain back pain or neck pain    Physical Exam   Triage vitals:  T 36.5 °C (97.7 °F)  HR 82  /68  RR 18  O2 100 %      Physical Exam  Vitals and nursing note reviewed.   Constitutional:       General: He is not in acute distress.     Appearance: Normal appearance. He is not ill-appearing.   HENT:      Head: Atraumatic.      Mouth/Throat:      Mouth: Mucous membranes are moist.      Pharynx: Oropharynx is clear.   Eyes:      Extraocular Movements: Extraocular movements intact.   Cardiovascular:      Rate and Rhythm: Normal rate and regular rhythm.      Pulses: Normal pulses.      Heart sounds: No murmur heard.  Pulmonary:      Effort: Pulmonary effort is normal. No respiratory distress.      Breath sounds: Normal breath sounds. No wheezing or rhonchi.   Abdominal:      General: Abdomen is flat.      Palpations: Abdomen is soft.      Tenderness: There is no abdominal tenderness.   Musculoskeletal:      Right lower leg: No edema.      Left lower leg: No edema.   Skin:     General: Skin is warm and dry.      Capillary Refill: Capillary refill takes less than 2 seconds.      Comments: Abrasion to left forehead.   Neurological:      General: No focal deficit present.      Mental Status: He is alert.           Medical Decision Making & ED Course   Medical Decision Makin y.o. male presents ED for concerns for head trauma.  Vital signs stable.  Obtain CT brain C-spine  ----      Differential diagnoses considered include but are not limited to: Intracranial trauma,  cervical fracture, concussion      EKG Independent Interpretation: EKG not obtained    Independent Result Review and Interpretation: Relevant laboratory and radiographic results were reviewed and independently interpreted by myself.  As necessary, they are commented on in the ED Course.    Chronic conditions affecting the patient's care: As documented above in MDM    The patient was discussed with the following consultants/services: None    Care Considerations: As documented above in MDM    ED Course:  Diagnoses as of 07/12/25 0656   Closed head injury, initial encounter   Fall, initial encounter   Weight loss, non-intentional   Protein-calorie malnutrition, unspecified severity (Multi)   Sleep disorder       Disposition   Patient was signed out to Dr. Parra at 0700 pending completion of their work-up.  Please see the next provider's transition of care note for the remainder of the patient's care.     Procedures   Procedures      Sunil Engle,   Emergency Medicine                                                       Sunil Engel,   07/09/25 0655       Sunil Engle DO  07/12/25 0656

## 2025-07-11 ENCOUNTER — TELEPHONE (OUTPATIENT)
Dept: HOME HEALTH SERVICES | Facility: HOME HEALTH | Age: OVER 89
End: 2025-07-11
Payer: COMMERCIAL

## 2025-07-11 NOTE — TELEPHONE ENCOUNTER
FYI patient was referred to [facility] for PT/OT upon hospital discharge however declined services when contacted for scheduling. Please ensure patient has a hospital follow up scheduled as they will not be monitored by home care.

## 2025-07-14 DIAGNOSIS — K59.00 CONSTIPATION, UNSPECIFIED CONSTIPATION TYPE: ICD-10-CM

## 2025-07-14 DIAGNOSIS — G89.29 CHRONIC GENERALIZED PAIN: ICD-10-CM

## 2025-07-14 DIAGNOSIS — I10 BENIGN ESSENTIAL HYPERTENSION: ICD-10-CM

## 2025-07-14 DIAGNOSIS — R60.0 EDEMA OF BOTH LOWER LEGS: ICD-10-CM

## 2025-07-14 DIAGNOSIS — N18.31 STAGE 3A CHRONIC KIDNEY DISEASE (MULTI): ICD-10-CM

## 2025-07-14 DIAGNOSIS — E78.2 MIXED HYPERLIPIDEMIA: ICD-10-CM

## 2025-07-14 DIAGNOSIS — E55.9 VITAMIN D DEFICIENCY: ICD-10-CM

## 2025-07-14 DIAGNOSIS — R52 CHRONIC GENERALIZED PAIN: ICD-10-CM

## 2025-07-24 LAB
ALBUMIN SERPL-MCNC: 4.1 G/DL (ref 3.6–5.1)
ALBUMIN/CREAT UR: 45 MG/G CREAT
ALP SERPL-CCNC: 57 U/L (ref 35–144)
ALT SERPL-CCNC: 19 U/L (ref 9–46)
ANION GAP SERPL CALCULATED.4IONS-SCNC: 14 MMOL/L (CALC) (ref 7–17)
AST SERPL-CCNC: 25 U/L (ref 10–35)
BILIRUB SERPL-MCNC: 0.6 MG/DL (ref 0.2–1.2)
BUN SERPL-MCNC: 45 MG/DL (ref 7–25)
CALCIUM SERPL-MCNC: 10.5 MG/DL (ref 8.6–10.3)
CHLORIDE SERPL-SCNC: 100 MMOL/L (ref 98–110)
CHOLEST SERPL-MCNC: 126 MG/DL
CHOLEST/HDLC SERPL: 1.8 (CALC)
CO2 SERPL-SCNC: 21 MMOL/L (ref 20–32)
CREAT SERPL-MCNC: 1.37 MG/DL (ref 0.7–1.22)
CREAT UR-MCNC: 126 MG/DL (ref 20–320)
EGFRCR SERPLBLD CKD-EPI 2021: 49 ML/MIN/1.73M2
GLUCOSE SERPL-MCNC: 80 MG/DL (ref 65–99)
HDLC SERPL-MCNC: 69 MG/DL
LDLC SERPL CALC-MCNC: 41 MG/DL (CALC)
MICROALBUMIN UR-MCNC: 5.7 MG/DL
NONHDLC SERPL-MCNC: 57 MG/DL (CALC)
POTASSIUM SERPL-SCNC: 5.3 MMOL/L (ref 3.5–5.3)
PROT SERPL-MCNC: 6.2 G/DL (ref 6.1–8.1)
SODIUM SERPL-SCNC: 135 MMOL/L (ref 135–146)
TRIGL SERPL-MCNC: 82 MG/DL
TSH SERPL-ACNC: NORMAL M[IU]/L
WBC # BLD AUTO: NORMAL THOUSAND/UL

## 2025-07-29 ENCOUNTER — APPOINTMENT (OUTPATIENT)
Dept: PRIMARY CARE | Facility: CLINIC | Age: OVER 89
End: 2025-07-29
Payer: COMMERCIAL

## 2025-07-30 LAB
ALBUMIN SERPL-MCNC: 4.1 G/DL (ref 3.6–5.1)
ALBUMIN/CREAT UR: 45 MG/G CREAT
ALP SERPL-CCNC: 57 U/L (ref 35–144)
ALT SERPL-CCNC: 19 U/L (ref 9–46)
ANION GAP SERPL CALCULATED.4IONS-SCNC: 14 MMOL/L (CALC) (ref 7–17)
AST SERPL-CCNC: 25 U/L (ref 10–35)
BILIRUB SERPL-MCNC: 0.6 MG/DL (ref 0.2–1.2)
BUN SERPL-MCNC: 45 MG/DL (ref 7–25)
CALCIUM SERPL-MCNC: 10.5 MG/DL (ref 8.6–10.3)
CHLORIDE SERPL-SCNC: 100 MMOL/L (ref 98–110)
CHOLEST SERPL-MCNC: 126 MG/DL
CHOLEST/HDLC SERPL: 1.8 (CALC)
CO2 SERPL-SCNC: 21 MMOL/L (ref 20–32)
CREAT SERPL-MCNC: 1.37 MG/DL (ref 0.7–1.22)
CREAT UR-MCNC: 126 MG/DL (ref 20–320)
EGFRCR SERPLBLD CKD-EPI 2021: 49 ML/MIN/1.73M2
GLUCOSE SERPL-MCNC: 80 MG/DL (ref 65–99)
HDLC SERPL-MCNC: 69 MG/DL
LDLC SERPL CALC-MCNC: 41 MG/DL (CALC)
MICROALBUMIN UR-MCNC: 5.7 MG/DL
NONHDLC SERPL-MCNC: 57 MG/DL (CALC)
POTASSIUM SERPL-SCNC: 5.3 MMOL/L (ref 3.5–5.3)
PROT SERPL-MCNC: 6.2 G/DL (ref 6.1–8.1)
SODIUM SERPL-SCNC: 135 MMOL/L (ref 135–146)
TRIGL SERPL-MCNC: 82 MG/DL
TSH SERPL-ACNC: 1.32 MIU/L (ref 0.4–4.5)
WBC # BLD AUTO: NORMAL THOUSAND/UL

## 2025-08-08 ENCOUNTER — APPOINTMENT (OUTPATIENT)
Dept: UROLOGY | Facility: CLINIC | Age: OVER 89
End: 2025-08-08
Payer: COMMERCIAL

## 2025-08-08 DIAGNOSIS — R39.9 URINARY SYMPTOM OR SIGN: ICD-10-CM

## 2025-08-11 ENCOUNTER — OFFICE VISIT (OUTPATIENT)
Dept: PAIN MEDICINE | Facility: HOSPITAL | Age: OVER 89
End: 2025-08-11
Payer: COMMERCIAL

## 2025-08-11 VITALS
SYSTOLIC BLOOD PRESSURE: 80 MMHG | RESPIRATION RATE: 16 BRPM | HEART RATE: 79 BPM | OXYGEN SATURATION: 98 % | DIASTOLIC BLOOD PRESSURE: 58 MMHG | WEIGHT: 122 LBS | BODY MASS INDEX: 20.83 KG/M2 | HEIGHT: 64 IN

## 2025-08-11 DIAGNOSIS — H21.213: Primary | ICD-10-CM

## 2025-08-11 DIAGNOSIS — M19.032 PRIMARY OSTEOARTHRITIS OF BOTH WRISTS: ICD-10-CM

## 2025-08-11 DIAGNOSIS — M15.9 POLYARTICULAR OSTEOARTHRITIS: ICD-10-CM

## 2025-08-11 DIAGNOSIS — M19.031 PRIMARY OSTEOARTHRITIS OF BOTH WRISTS: ICD-10-CM

## 2025-08-11 PROCEDURE — 99214 OFFICE O/P EST MOD 30 MIN: CPT | Performed by: CLINICAL NURSE SPECIALIST

## 2025-08-11 PROCEDURE — 1159F MED LIST DOCD IN RCRD: CPT | Performed by: CLINICAL NURSE SPECIALIST

## 2025-08-11 PROCEDURE — 1160F RVW MEDS BY RX/DR IN RCRD: CPT | Performed by: CLINICAL NURSE SPECIALIST

## 2025-08-11 PROCEDURE — 3078F DIAST BP <80 MM HG: CPT | Performed by: CLINICAL NURSE SPECIALIST

## 2025-08-11 PROCEDURE — G2211 COMPLEX E/M VISIT ADD ON: HCPCS | Performed by: CLINICAL NURSE SPECIALIST

## 2025-08-11 PROCEDURE — 3074F SYST BP LT 130 MM HG: CPT | Performed by: CLINICAL NURSE SPECIALIST

## 2025-08-11 RX ORDER — LIDOCAINE 50 MG/G
1 PATCH TOPICAL DAILY
Qty: 30 PATCH | Refills: 2 | Status: SHIPPED | OUTPATIENT
Start: 2025-08-11 | End: 2025-09-10

## 2025-08-11 RX ORDER — DULOXETIN HYDROCHLORIDE 20 MG/1
40 CAPSULE, DELAYED RELEASE ORAL DAILY
Qty: 60 CAPSULE | Refills: 2 | Status: SHIPPED | OUTPATIENT
Start: 2025-08-11 | End: 2025-09-10

## 2025-08-11 ASSESSMENT — ENCOUNTER SYMPTOMS
LOSS OF SENSATION IN FEET: 0
OCCASIONAL FEELINGS OF UNSTEADINESS: 1
DEPRESSION: 0

## 2025-08-12 ENCOUNTER — TELEPHONE (OUTPATIENT)
Dept: PAIN MEDICINE | Facility: HOSPITAL | Age: OVER 89
End: 2025-08-12
Payer: COMMERCIAL

## 2025-08-12 DIAGNOSIS — R60.0 EDEMA OF BOTH LOWER LEGS: ICD-10-CM

## 2025-08-12 RX ORDER — FUROSEMIDE 20 MG/1
20 TABLET ORAL DAILY PRN
Qty: 30 TABLET | Refills: 0 | Status: SHIPPED | OUTPATIENT
Start: 2025-08-12

## 2025-08-26 ENCOUNTER — APPOINTMENT (OUTPATIENT)
Dept: RADIOLOGY | Facility: HOSPITAL | Age: OVER 89
End: 2025-08-26
Payer: COMMERCIAL

## 2025-08-26 ENCOUNTER — APPOINTMENT (OUTPATIENT)
Dept: CARDIOLOGY | Facility: HOSPITAL | Age: OVER 89
End: 2025-08-26
Payer: COMMERCIAL

## 2025-08-26 ENCOUNTER — HOSPITAL ENCOUNTER (EMERGENCY)
Facility: HOSPITAL | Age: OVER 89
Discharge: HOME | End: 2025-08-26
Attending: EMERGENCY MEDICINE
Payer: COMMERCIAL

## 2025-08-26 ENCOUNTER — APPOINTMENT (OUTPATIENT)
Dept: PRIMARY CARE | Facility: CLINIC | Age: OVER 89
End: 2025-08-26
Payer: COMMERCIAL

## 2025-08-26 ASSESSMENT — PAIN - FUNCTIONAL ASSESSMENT: PAIN_FUNCTIONAL_ASSESSMENT: 0-10

## 2025-08-26 ASSESSMENT — PAIN SCALES - GENERAL
PAINLEVEL_OUTOF10: 0 - NO PAIN
PAINLEVEL_OUTOF10: 10 - WORST POSSIBLE PAIN
PAINLEVEL_OUTOF10: 0 - NO PAIN

## 2025-08-26 ASSESSMENT — PAIN DESCRIPTION - LOCATION: LOCATION: ABDOMEN

## 2025-08-26 ASSESSMENT — PAIN DESCRIPTION - PAIN TYPE: TYPE: ACUTE PAIN

## 2025-08-28 ENCOUNTER — APPOINTMENT (OUTPATIENT)
Dept: UROLOGY | Facility: CLINIC | Age: OVER 89
End: 2025-08-28
Payer: COMMERCIAL

## 2025-08-28 DIAGNOSIS — R39.9 URINARY SYMPTOM OR SIGN: ICD-10-CM

## 2025-08-28 DIAGNOSIS — R97.20 ELEVATED PSA: ICD-10-CM

## 2025-08-28 DIAGNOSIS — N40.1 BENIGN PROSTATIC HYPERPLASIA WITH LOWER URINARY TRACT SYMPTOMS, SYMPTOM DETAILS UNSPECIFIED: Primary | ICD-10-CM

## 2025-08-28 DIAGNOSIS — R33.9 URINARY RETENTION: ICD-10-CM

## 2025-08-28 PROCEDURE — 1036F TOBACCO NON-USER: CPT | Performed by: UROLOGY

## 2025-08-28 PROCEDURE — 1159F MED LIST DOCD IN RCRD: CPT | Performed by: UROLOGY

## 2025-08-28 PROCEDURE — 99214 OFFICE O/P EST MOD 30 MIN: CPT | Performed by: UROLOGY

## 2026-01-29 ENCOUNTER — APPOINTMENT (OUTPATIENT)
Dept: PRIMARY CARE | Facility: CLINIC | Age: OVER 89
End: 2026-01-29
Payer: COMMERCIAL